# Patient Record
Sex: MALE | Race: WHITE | Employment: OTHER | URBAN - METROPOLITAN AREA
[De-identification: names, ages, dates, MRNs, and addresses within clinical notes are randomized per-mention and may not be internally consistent; named-entity substitution may affect disease eponyms.]

---

## 2017-01-09 RX ORDER — VALSARTAN 160 MG/1
TABLET ORAL
Qty: 90 TABLET | Refills: 0 | Status: SHIPPED | OUTPATIENT
Start: 2017-01-09 | End: 2017-03-10

## 2017-03-03 ENCOUNTER — LAB ENCOUNTER (OUTPATIENT)
Dept: LAB | Age: 72
End: 2017-03-03
Attending: UROLOGY
Payer: MEDICARE

## 2017-03-03 DIAGNOSIS — R97.20 ELEVATED PSA: ICD-10-CM

## 2017-03-03 LAB
BUN BLD-MCNC: 21 MG/DL (ref 8–20)
CREAT BLD-MCNC: 1.04 MG/DL (ref 0.7–1.3)

## 2017-03-03 PROCEDURE — 84520 ASSAY OF UREA NITROGEN: CPT

## 2017-03-03 PROCEDURE — 36415 COLL VENOUS BLD VENIPUNCTURE: CPT

## 2017-03-03 PROCEDURE — 82565 ASSAY OF CREATININE: CPT

## 2017-03-10 ENCOUNTER — OFFICE VISIT (OUTPATIENT)
Dept: FAMILY MEDICINE CLINIC | Facility: CLINIC | Age: 72
End: 2017-03-10

## 2017-03-10 VITALS
HEART RATE: 64 BPM | BODY MASS INDEX: 31 KG/M2 | RESPIRATION RATE: 12 BRPM | TEMPERATURE: 98 F | DIASTOLIC BLOOD PRESSURE: 80 MMHG | WEIGHT: 248 LBS | SYSTOLIC BLOOD PRESSURE: 130 MMHG

## 2017-03-10 DIAGNOSIS — I10 ESSENTIAL HYPERTENSION: Primary | ICD-10-CM

## 2017-03-10 DIAGNOSIS — E78.1 PURE HYPERGLYCERIDEMIA: ICD-10-CM

## 2017-03-10 DIAGNOSIS — N40.1 BENIGN NON-NODULAR PROSTATIC HYPERPLASIA WITH LOWER URINARY TRACT SYMPTOMS: ICD-10-CM

## 2017-03-10 PROCEDURE — 99214 OFFICE O/P EST MOD 30 MIN: CPT | Performed by: FAMILY MEDICINE

## 2017-03-10 RX ORDER — VALSARTAN 160 MG/1
160 TABLET ORAL
Qty: 90 TABLET | Refills: 1 | Status: SHIPPED | OUTPATIENT
Start: 2017-03-10 | End: 2017-10-05

## 2017-03-10 NOTE — PROGRESS NOTES
HPI:   Elida Catalan is a 80year old male who presents for recheck of hyperlipidemia. Patient reports taking 3 fish oil tabs BID no medication side effects noted. Denies any generalized muscle aches. Patient presents for recheck of his hypertension. 1,200 mg by mouth once daily. 6 daily Disp:  Rfl:    Aspirin 81 MG Oral Tab Take 81 mg by mouth daily. Disp:  Rfl:    Multiple Vitamin (MULTIVITAMINS OR) Take  by mouth.  Disp:  Rfl:       Past Medical History   Diagnosis Date   • Hx of oral surgery

## 2017-09-12 ENCOUNTER — CHARTING TRANS (OUTPATIENT)
Dept: OTHER | Age: 72
End: 2017-09-12

## 2017-10-05 DIAGNOSIS — I10 ESSENTIAL HYPERTENSION: ICD-10-CM

## 2017-10-05 RX ORDER — VALSARTAN 160 MG/1
TABLET ORAL
Qty: 90 TABLET | Refills: 0 | Status: SHIPPED | OUTPATIENT
Start: 2017-10-05 | End: 2017-12-29

## 2017-11-13 ENCOUNTER — TELEPHONE (OUTPATIENT)
Dept: FAMILY MEDICINE CLINIC | Facility: CLINIC | Age: 72
End: 2017-11-13

## 2017-11-13 NOTE — TELEPHONE ENCOUNTER
S/w pt. Reports having intermittent RUQ pain past week or so. Says it very sporadic, sharp  at times. Denies any n/v/d or fever. Appt given for tomorrow. Dr Karl Alcaraz schedule full quite  while. Pt agrees to see Nata Alejandre.

## 2017-11-14 ENCOUNTER — OFFICE VISIT (OUTPATIENT)
Dept: FAMILY MEDICINE CLINIC | Facility: CLINIC | Age: 72
End: 2017-11-14

## 2017-11-14 ENCOUNTER — LAB ENCOUNTER (OUTPATIENT)
Dept: LAB | Age: 72
End: 2017-11-14
Attending: FAMILY MEDICINE
Payer: MEDICARE

## 2017-11-14 VITALS
SYSTOLIC BLOOD PRESSURE: 120 MMHG | HEART RATE: 72 BPM | WEIGHT: 247 LBS | TEMPERATURE: 98 F | HEIGHT: 75 IN | DIASTOLIC BLOOD PRESSURE: 74 MMHG | BODY MASS INDEX: 30.71 KG/M2 | RESPIRATION RATE: 18 BRPM | OXYGEN SATURATION: 99 %

## 2017-11-14 DIAGNOSIS — R10.11 RIGHT UPPER QUADRANT PAIN: Primary | ICD-10-CM

## 2017-11-14 DIAGNOSIS — R97.20 ELEVATED PSA: ICD-10-CM

## 2017-11-14 DIAGNOSIS — R10.11 RIGHT UPPER QUADRANT PAIN: ICD-10-CM

## 2017-11-14 DIAGNOSIS — R20.2 PARESTHESIA OF SKIN: ICD-10-CM

## 2017-11-14 PROCEDURE — 85025 COMPLETE CBC W/AUTO DIFF WBC: CPT | Performed by: NURSE PRACTITIONER

## 2017-11-14 PROCEDURE — 99213 OFFICE O/P EST LOW 20 MIN: CPT | Performed by: NURSE PRACTITIONER

## 2017-11-14 PROCEDURE — 80053 COMPREHEN METABOLIC PANEL: CPT | Performed by: NURSE PRACTITIONER

## 2017-11-14 PROCEDURE — 36415 COLL VENOUS BLD VENIPUNCTURE: CPT | Performed by: NURSE PRACTITIONER

## 2017-11-15 NOTE — PROGRESS NOTES
Rafaela Motley is a 71year old male. HPI:   Pt presents today reporting right upper quadrant abdominal pain for the past 2 weeks. He describes the pain as short and stabbing.  He reports that he has noticed it more in the later afternoon-early evening Denies diarrhea. Denies pain worsens with eating. Denies pain improves with eating. Denies any nausea. Denies any vomiting.    NEURO: denies headaches  Musculoskeletal: No motor deficits  EXAM:   /74   Pulse 72   Temp 97.6 °F (36.4 °C) (Oral)   Resp 1

## 2017-11-17 ENCOUNTER — HOSPITAL ENCOUNTER (OUTPATIENT)
Dept: ULTRASOUND IMAGING | Age: 72
Discharge: HOME | End: 2017-11-17
Attending: NURSE PRACTITIONER
Payer: MEDICARE

## 2017-11-17 DIAGNOSIS — K76.9 LESION OF RIGHT LOBE OF LIVER: ICD-10-CM

## 2017-11-17 DIAGNOSIS — K80.20 GALLSTONES: ICD-10-CM

## 2017-11-17 DIAGNOSIS — R10.11 RIGHT UPPER QUADRANT PAIN: ICD-10-CM

## 2017-11-17 DIAGNOSIS — K80.20 GALLSTONES: Primary | ICD-10-CM

## 2017-11-17 DIAGNOSIS — K76.9 LESION OF LEFT LOBE OF LIVER: Primary | ICD-10-CM

## 2017-11-17 PROCEDURE — 76700 US EXAM ABDOM COMPLETE: CPT | Performed by: NURSE PRACTITIONER

## 2017-11-27 ENCOUNTER — HOSPITAL ENCOUNTER (OUTPATIENT)
Dept: MRI IMAGING | Age: 72
Discharge: HOME | End: 2017-11-27
Attending: NURSE PRACTITIONER
Payer: MEDICARE

## 2017-11-27 DIAGNOSIS — K76.9 LESION OF LEFT LOBE OF LIVER: ICD-10-CM

## 2017-11-27 DIAGNOSIS — K76.9 LESION OF RIGHT LOBE OF LIVER: ICD-10-CM

## 2017-11-27 PROCEDURE — A9581 GADOXETATE DISODIUM INJ: HCPCS | Performed by: NURSE PRACTITIONER

## 2017-11-27 PROCEDURE — 74183 MRI ABD W/O CNTR FLWD CNTR: CPT | Performed by: NURSE PRACTITIONER

## 2017-11-29 ENCOUNTER — TELEPHONE (OUTPATIENT)
Dept: FAMILY MEDICINE CLINIC | Facility: CLINIC | Age: 72
End: 2017-11-29

## 2017-12-08 ENCOUNTER — OFFICE VISIT (OUTPATIENT)
Dept: SURGERY | Facility: CLINIC | Age: 72
End: 2017-12-08

## 2017-12-08 VITALS
BODY MASS INDEX: 31 KG/M2 | WEIGHT: 247 LBS | DIASTOLIC BLOOD PRESSURE: 70 MMHG | HEART RATE: 76 BPM | SYSTOLIC BLOOD PRESSURE: 125 MMHG | RESPIRATION RATE: 16 BRPM

## 2017-12-08 DIAGNOSIS — K80.10 CALCULUS OF GALLBLADDER WITH CHRONIC CHOLECYSTITIS WITHOUT OBSTRUCTION: Primary | ICD-10-CM

## 2017-12-08 PROCEDURE — 99203 OFFICE O/P NEW LOW 30 MIN: CPT | Performed by: SURGERY

## 2017-12-08 NOTE — H&P
New Patient Visit Note       Active Problems      1.  Calculus of gallbladder with chronic cholecystitis without obstruction        Chief Complaint   Patient presents with:  Establish Care: Gallstones      History of Present Illness     Patient presents at Smoking status: Never Smoker                                                                Smokeless tobacco: Never Used                        Alcohol use:  Yes                Comment: WINE 1 GLASS AT NIGHT    Drug use: No            Other Topics and time. He appears well-developed and well-nourished. No distress. HENT:   Head: Normocephalic and atraumatic. Eyes: Pupils are equal, round, and reactive to light. No scleral icterus. Neck: Normal range of motion. Neck supple. No JVD present.  No t and lifting recommendations were discussed in length. ·   · The risks, benefits, and alternatives to the procedure were explained to the patient.   The risks explained include, but are not limited to, bleeding, infection, pain wound complications, recurre

## 2017-12-20 ENCOUNTER — OFFICE VISIT (OUTPATIENT)
Dept: SURGERY | Facility: CLINIC | Age: 72
End: 2017-12-20

## 2017-12-20 VITALS
BODY MASS INDEX: 30.77 KG/M2 | SYSTOLIC BLOOD PRESSURE: 132 MMHG | HEART RATE: 71 BPM | HEIGHT: 75 IN | OXYGEN SATURATION: 98 % | DIASTOLIC BLOOD PRESSURE: 73 MMHG | TEMPERATURE: 98 F | WEIGHT: 247.5 LBS

## 2017-12-20 DIAGNOSIS — K76.0 NAFLD (NONALCOHOLIC FATTY LIVER DISEASE): ICD-10-CM

## 2017-12-20 DIAGNOSIS — K76.9 LIVER LESION: Primary | ICD-10-CM

## 2017-12-20 NOTE — PROGRESS NOTES
[facility] at [facility]  [address] [facility] [address], [address]  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  His liver tests are normal and his imaging is not bergeron

## 2017-12-29 ENCOUNTER — OFFICE VISIT (OUTPATIENT)
Dept: FAMILY MEDICINE CLINIC | Facility: CLINIC | Age: 72
End: 2017-12-29

## 2017-12-29 VITALS
TEMPERATURE: 98 F | WEIGHT: 247 LBS | RESPIRATION RATE: 16 BRPM | DIASTOLIC BLOOD PRESSURE: 84 MMHG | HEART RATE: 76 BPM | BODY MASS INDEX: 30.71 KG/M2 | HEIGHT: 75 IN | SYSTOLIC BLOOD PRESSURE: 120 MMHG

## 2017-12-29 DIAGNOSIS — I10 ESSENTIAL HYPERTENSION: ICD-10-CM

## 2017-12-29 DIAGNOSIS — Z00.00 ENCOUNTER FOR ANNUAL HEALTH EXAMINATION: Primary | ICD-10-CM

## 2017-12-29 DIAGNOSIS — E78.1 PURE HYPERGLYCERIDEMIA: ICD-10-CM

## 2017-12-29 PROCEDURE — 96160 PT-FOCUSED HLTH RISK ASSMT: CPT | Performed by: FAMILY MEDICINE

## 2017-12-29 PROCEDURE — G0439 PPPS, SUBSEQ VISIT: HCPCS | Performed by: FAMILY MEDICINE

## 2017-12-29 RX ORDER — VALSARTAN 160 MG/1
TABLET ORAL
Qty: 90 TABLET | Refills: 1 | Status: SHIPPED | OUTPATIENT
Start: 2017-12-29 | End: 2018-06-21

## 2017-12-29 NOTE — PROGRESS NOTES
HPI:   Donaldo Schaffer is a 71year old male who presents for a MA (Medicare Advantage) [address] [facility] (Once per calendar year). Patient states he has no new complaints. He is seen by urology for his elevated PSA.   Patient is here for follow-up of h MD as PCP - General (Family Practice)    Patient Active Problem List:     Other abnormal blood chemistry     Other symptoms involving cardiovascular system     Microscopic hematuria     Essential hypertension     Hyperlipidemia     Right sided sciatica SYSTEMS:   GENERAL: feels well otherwise  SKIN: denies any unusual skin lesions  EYES: denies blurred vision or double vision  HEENT: denies nasal congestion, sinus pain or ST  LUNGS: denies shortness of breath with exertion  CARDIOVASCULAR: denies chest p No tenderness or deformity   Heart:  Regular rate and rhythm, S1, S2 normal, no murmur, rub or gallop   Abdomen:   Soft, non-tender, bowel sounds active all four quadrants,  no masses, no organomegaly   Genitalia:  Deferred, checked by urology recently   R patient  PREVENTATIVE SERVICES  INDICATIONS AND SCHEDULE Internal Lab or Procedure External Lab or Procedure   Diabetes Screening      HbgA1C   Annually HgbA1C (%)   Date Value   10/28/2015 5.6       No flowsheet data found.     Fasting Blood Sugar (FSB)Jaida HepB virus carrier   Homosexual men   Illicit injectable drug abusers     Tetanus Toxoid  Only covered with a cut with metal- TD and TDaP Not covered by Medicare Part B) No vaccine history found This may be covered with your prescription benefits, but Medi

## 2017-12-29 NOTE — PATIENT INSTRUCTIONS
Redd Daniels's SCREENING SCHEDULE   Tests on this list are recommended by your physician but may not be covered, or covered at this frequency, by your insurer. Please check with your insurance carrier before scheduling to verify coverage.     PREVEN Medicare Visit    Abdominal aortic aneurysm screening (once between ages 73-68)  No results found for this or any previous visit.  Limited to patients who meet one of the following criteria:   • Men who are 73-68 years old and have smoked more than 100 ciga or IX concentrates   Clients of institutions for the mentally retarded   Persons who live in the same house as a HepB virus carrier   Homosexual men   Illicit injectable drug abusers     Tetanus Toxoid- Only covered with a cut with metal- TD and TDaP Not c

## 2018-01-10 ENCOUNTER — APPOINTMENT (OUTPATIENT)
Dept: LAB | Age: 73
End: 2018-01-10
Attending: FAMILY MEDICINE
Payer: MEDICARE

## 2018-05-11 ENCOUNTER — HOSPITAL ENCOUNTER (OUTPATIENT)
Dept: MRI IMAGING | Age: 73
Discharge: HOME | End: 2018-05-11
Attending: INTERNAL MEDICINE
Payer: MEDICARE

## 2018-05-11 DIAGNOSIS — K76.9 LIVER LESION: ICD-10-CM

## 2018-05-11 PROCEDURE — A9581 GADOXETATE DISODIUM INJ: HCPCS | Performed by: INTERNAL MEDICINE

## 2018-05-11 PROCEDURE — 74183 MRI ABD W/O CNTR FLWD CNTR: CPT | Performed by: INTERNAL MEDICINE

## 2018-05-21 ENCOUNTER — OFFICE VISIT (OUTPATIENT)
Dept: SURGERY | Facility: CLINIC | Age: 73
End: 2018-05-21

## 2018-05-21 VITALS
DIASTOLIC BLOOD PRESSURE: 70 MMHG | OXYGEN SATURATION: 96 % | SYSTOLIC BLOOD PRESSURE: 111 MMHG | BODY MASS INDEX: 30.4 KG/M2 | RESPIRATION RATE: 20 BRPM | HEIGHT: 75 IN | HEART RATE: 69 BPM | WEIGHT: 244.5 LBS

## 2018-05-21 DIAGNOSIS — K76.9 LIVER LESION: Primary | ICD-10-CM

## 2018-05-21 RX ORDER — AMOXICILLIN 500 MG/1
TABLET, FILM COATED ORAL
Refills: 0 | COMMUNITY
Start: 2018-04-18 | End: 2018-05-21 | Stop reason: ALTCHOICE

## 2018-05-21 RX ORDER — HYDROCODONE BITARTRATE AND ACETAMINOPHEN 5; 325 MG/1; MG/1
TABLET ORAL
Refills: 0 | COMMUNITY
Start: 2018-04-18 | End: 2018-05-21 | Stop reason: ALTCHOICE

## 2018-05-21 NOTE — PROGRESS NOTES
[facility]  [address] [facility], [address]  [address]  [phone] ([phone]) no asterixis  Psych: normal affect/mood     Assessment and Plan:  68year old with NAFLD and three liver lesions (1 cyst, 2?hemangiomas)    - U/S in 6 months; if lesions remain stable in size then likely re-image in 1 year.  Discussed that almost certainly

## 2018-06-20 ENCOUNTER — LAB ENCOUNTER (OUTPATIENT)
Dept: LAB | Age: 73
End: 2018-06-20
Attending: FAMILY MEDICINE
Payer: MEDICARE

## 2018-06-20 DIAGNOSIS — K76.9 LIVER LESION: ICD-10-CM

## 2018-06-20 DIAGNOSIS — E78.1 PURE HYPERGLYCERIDEMIA: ICD-10-CM

## 2018-06-20 DIAGNOSIS — R73.9 HYPERGLYCEMIA: ICD-10-CM

## 2018-06-20 DIAGNOSIS — R73.9 HYPERGLYCEMIA: Primary | ICD-10-CM

## 2018-06-20 DIAGNOSIS — K76.9 LIVER LESION: Primary | ICD-10-CM

## 2018-06-20 DIAGNOSIS — I10 ESSENTIAL HYPERTENSION: ICD-10-CM

## 2018-06-20 PROCEDURE — 85025 COMPLETE CBC W/AUTO DIFF WBC: CPT

## 2018-06-20 PROCEDURE — 80053 COMPREHEN METABOLIC PANEL: CPT

## 2018-06-20 PROCEDURE — 36415 COLL VENOUS BLD VENIPUNCTURE: CPT

## 2018-06-20 PROCEDURE — 83036 HEMOGLOBIN GLYCOSYLATED A1C: CPT

## 2018-06-20 PROCEDURE — 82105 ALPHA-FETOPROTEIN SERUM: CPT

## 2018-06-20 PROCEDURE — 80061 LIPID PANEL: CPT

## 2018-06-21 DIAGNOSIS — I10 ESSENTIAL HYPERTENSION: ICD-10-CM

## 2018-06-21 RX ORDER — VALSARTAN 160 MG/1
TABLET ORAL
Qty: 30 TABLET | Refills: 0 | Status: SHIPPED | OUTPATIENT
Start: 2018-06-21 | End: 2018-07-24

## 2018-06-21 NOTE — TELEPHONE ENCOUNTER
Pt scheduled a 6 month BP med check with Dr. Kenyetta Quick on 07/23. Pt has 10 more pills for his BP medication.

## 2018-06-29 PROCEDURE — 88305 TISSUE EXAM BY PATHOLOGIST: CPT | Performed by: INTERNAL MEDICINE

## 2018-07-23 ENCOUNTER — APPOINTMENT (OUTPATIENT)
Dept: LAB | Age: 73
End: 2018-07-23
Attending: FAMILY MEDICINE
Payer: MEDICARE

## 2018-07-23 ENCOUNTER — OFFICE VISIT (OUTPATIENT)
Dept: FAMILY MEDICINE CLINIC | Facility: CLINIC | Age: 73
End: 2018-07-23
Payer: MEDICARE

## 2018-07-23 VITALS
HEART RATE: 74 BPM | HEIGHT: 74.5 IN | DIASTOLIC BLOOD PRESSURE: 80 MMHG | BODY MASS INDEX: 30.28 KG/M2 | WEIGHT: 238.5 LBS | SYSTOLIC BLOOD PRESSURE: 128 MMHG | OXYGEN SATURATION: 99 % | RESPIRATION RATE: 16 BRPM

## 2018-07-23 DIAGNOSIS — I10 ESSENTIAL HYPERTENSION: Primary | ICD-10-CM

## 2018-07-23 DIAGNOSIS — R97.20 ELEVATED PSA, BETWEEN 10 AND LESS THAN 20 NG/ML: ICD-10-CM

## 2018-07-23 DIAGNOSIS — E78.1 PURE HYPERGLYCERIDEMIA: ICD-10-CM

## 2018-07-23 LAB
PSA FREE MFR SERPL: 27 %
PSA FREE SERPL-MCNC: 3.64 NG/ML
PSA SERPL-MCNC: 13.6 NG/ML (ref 0.01–4)

## 2018-07-23 PROCEDURE — 84153 ASSAY OF PSA TOTAL: CPT

## 2018-07-23 PROCEDURE — 99214 OFFICE O/P EST MOD 30 MIN: CPT | Performed by: FAMILY MEDICINE

## 2018-07-23 PROCEDURE — 36415 COLL VENOUS BLD VENIPUNCTURE: CPT

## 2018-07-23 PROCEDURE — 84154 ASSAY OF PSA FREE: CPT

## 2018-07-23 RX ORDER — VALSARTAN 160 MG/1
TABLET ORAL
Qty: 90 TABLET | Refills: 1 | Status: CANCELLED | OUTPATIENT
Start: 2018-07-23

## 2018-07-23 NOTE — PROGRESS NOTES
HPI:   Elida Catalan is a 68year old male who presents for recheck of hyperlipidemia. Patient reports taking 3 fish oil tabs BID no medication side effects noted. Denies any generalized muscle aches. Patient presents for recheck of his hypertension. 11/14/2017 44   10/20/2016 34   ----------       Current Outpatient Prescriptions:  Omega-3 Fatty Acids (FISH OIL OR) Take 1,200 mg by mouth once daily. 6 daily Disp:  Rfl:    Aspirin 81 MG Oral Tab Take 81 mg by mouth daily.  Disp:  Rfl:    Multiple Suzette 20 ng/ml  Patient to follow-up with urology  - PSA, TOTAL AND FREE; Future    3. Pure hyperglyceridemia  Patient to follow low-fat diet and continue with fish oil supplementation  - COMP METABOLIC PANEL (14); Future  - LIPID PANEL;  Future

## 2018-07-24 ENCOUNTER — TELEPHONE (OUTPATIENT)
Dept: FAMILY MEDICINE CLINIC | Facility: CLINIC | Age: 73
End: 2018-07-24

## 2018-07-24 RX ORDER — LOSARTAN POTASSIUM 100 MG/1
100 TABLET ORAL DAILY
Qty: 90 TABLET | Refills: 0 | Status: SHIPPED | OUTPATIENT
Start: 2018-07-24 | End: 2018-10-17

## 2018-07-24 NOTE — TELEPHONE ENCOUNTER
Pt advised     Rx for Losartan 100 mg daily sent to replace Valsartan 160 mg daily to pharm     Advised to monitor BP and update in 10-14 days     Fyi

## 2018-07-24 NOTE — TELEPHONE ENCOUNTER
Pt called and advised that he needs a similar medicine to valsartan 160 MG Oral Tab. He was advised that his lot was recalled.   Please advise at [phone]

## 2018-09-27 ENCOUNTER — CHARTING TRANS (OUTPATIENT)
Dept: OTHER | Age: 73
End: 2018-09-27

## 2018-10-17 RX ORDER — LOSARTAN POTASSIUM 100 MG/1
TABLET ORAL
Qty: 90 TABLET | Refills: 0 | Status: SHIPPED | OUTPATIENT
Start: 2018-10-17 | End: 2019-01-14

## 2018-11-02 VITALS — TEMPERATURE: 98.2 F

## 2018-11-19 ENCOUNTER — HOSPITAL ENCOUNTER (OUTPATIENT)
Dept: ULTRASOUND IMAGING | Age: 73
Discharge: HOME | End: 2018-11-19
Attending: INTERNAL MEDICINE
Payer: MEDICARE

## 2018-11-19 DIAGNOSIS — K76.9 LIVER LESION: ICD-10-CM

## 2018-11-19 PROCEDURE — 76700 US EXAM ABDOM COMPLETE: CPT | Performed by: INTERNAL MEDICINE

## 2018-12-07 DIAGNOSIS — K76.9 LIVER LESION: Primary | ICD-10-CM

## 2019-01-14 ENCOUNTER — OFFICE VISIT (OUTPATIENT)
Dept: FAMILY MEDICINE CLINIC | Facility: CLINIC | Age: 74
End: 2019-01-14
Payer: MEDICARE

## 2019-01-14 ENCOUNTER — TELEPHONE (OUTPATIENT)
Dept: FAMILY MEDICINE CLINIC | Facility: CLINIC | Age: 74
End: 2019-01-14

## 2019-01-14 VITALS
BODY MASS INDEX: 29.59 KG/M2 | RESPIRATION RATE: 16 BRPM | HEART RATE: 64 BPM | SYSTOLIC BLOOD PRESSURE: 118 MMHG | WEIGHT: 233 LBS | TEMPERATURE: 97 F | HEIGHT: 74.5 IN | DIASTOLIC BLOOD PRESSURE: 68 MMHG

## 2019-01-14 DIAGNOSIS — R39.11 BENIGN PROSTATIC HYPERPLASIA WITH URINARY HESITANCY: ICD-10-CM

## 2019-01-14 DIAGNOSIS — R31.29 MICROSCOPIC HEMATURIA: ICD-10-CM

## 2019-01-14 DIAGNOSIS — R97.20 ELEVATED PSA, BETWEEN 10 AND LESS THAN 20 NG/ML: ICD-10-CM

## 2019-01-14 DIAGNOSIS — I10 ESSENTIAL HYPERTENSION: Primary | ICD-10-CM

## 2019-01-14 DIAGNOSIS — N40.1 BENIGN PROSTATIC HYPERPLASIA WITH URINARY HESITANCY: ICD-10-CM

## 2019-01-14 PROCEDURE — 99214 OFFICE O/P EST MOD 30 MIN: CPT | Performed by: FAMILY MEDICINE

## 2019-01-14 RX ORDER — LOSARTAN POTASSIUM 100 MG/1
TABLET ORAL
Qty: 90 TABLET | Refills: 1 | Status: SHIPPED | OUTPATIENT
Start: 2019-01-14 | End: 2019-07-10

## 2019-01-14 NOTE — PROGRESS NOTES
HPI:   Yolanda Moura is a 68year old male who presents for recheck of hyperlipidemia. Patient reports taking 3 fish oil tabs BID no medication side effects noted. Denies any generalized muscle aches. Patient presents for recheck of his hypertension. TABLET(100 MG) BY MOUTH DAILY Disp: 90 tablet Rfl: 1   Omega-3 Fatty Acids (FISH OIL OR) Take 1,200 mg by mouth once daily. 6 daily Disp:  Rfl:    Aspirin 81 MG Oral Tab Take 81 mg by mouth daily.  Disp:  Rfl:    Multiple Vitamin (MULTIVITAMINS OR) Take  by ng/ml  Continue follow-up with urology    4. Microscopic hematuria  Continue follow-up with urology    Will have CMP and lipid originally ordered on 7/27/2018 done in the next several days.

## 2019-01-18 ENCOUNTER — LAB ENCOUNTER (OUTPATIENT)
Dept: LAB | Age: 74
End: 2019-01-18
Attending: FAMILY MEDICINE
Payer: MEDICARE

## 2019-01-18 DIAGNOSIS — R97.20 ELEVATED PSA, BETWEEN 10 AND LESS THAN 20 NG/ML: ICD-10-CM

## 2019-01-18 DIAGNOSIS — R97.20 ELEVATED PSA, BETWEEN 10 AND LESS THAN 20 NG/ML: Primary | ICD-10-CM

## 2019-01-18 DIAGNOSIS — E78.1 PURE HYPERGLYCERIDEMIA: ICD-10-CM

## 2019-01-18 DIAGNOSIS — I10 ESSENTIAL HYPERTENSION: ICD-10-CM

## 2019-01-18 LAB
ALBUMIN SERPL-MCNC: 3.7 G/DL (ref 3.1–4.5)
ALBUMIN/GLOB SERPL: 1 {RATIO} (ref 1–2)
ALP LIVER SERPL-CCNC: 58 U/L (ref 45–117)
ALT SERPL-CCNC: 28 U/L (ref 17–63)
ANION GAP SERPL CALC-SCNC: 7 MMOL/L (ref 0–18)
AST SERPL-CCNC: 17 U/L (ref 15–41)
BILIRUB SERPL-MCNC: 0.8 MG/DL (ref 0.1–2)
BUN BLD-MCNC: 18 MG/DL (ref 8–20)
BUN/CREAT SERPL: 16.5 (ref 10–20)
CALCIUM BLD-MCNC: 9.3 MG/DL (ref 8.3–10.3)
CHLORIDE SERPL-SCNC: 107 MMOL/L (ref 101–111)
CHOLEST SMN-MCNC: 162 MG/DL (ref ?–200)
CO2 SERPL-SCNC: 28 MMOL/L (ref 22–32)
CREAT BLD-MCNC: 1.09 MG/DL (ref 0.7–1.3)
GLOBULIN PLAS-MCNC: 3.7 G/DL (ref 2.8–4.4)
GLUCOSE BLD-MCNC: 90 MG/DL (ref 70–99)
HDLC SERPL-MCNC: 39 MG/DL (ref 40–59)
LDLC SERPL CALC-MCNC: 92 MG/DL (ref ?–100)
M PROTEIN MFR SERPL ELPH: 7.4 G/DL (ref 6.4–8.2)
NONHDLC SERPL-MCNC: 123 MG/DL (ref ?–130)
OSMOLALITY SERPL CALC.SUM OF ELEC: 295 MOSM/KG (ref 275–295)
POTASSIUM SERPL-SCNC: 4.3 MMOL/L (ref 3.6–5.1)
PSA FREE MFR SERPL: 26 %
PSA FREE SERPL-MCNC: 4.33 NG/ML
PSA SERPL-MCNC: 16.8 NG/ML (ref 0.01–4)
SODIUM SERPL-SCNC: 142 MMOL/L (ref 136–144)
TRIGL SERPL-MCNC: 157 MG/DL (ref 30–149)
VLDLC SERPL CALC-MCNC: 31 MG/DL (ref 0–30)

## 2019-01-18 PROCEDURE — 84153 ASSAY OF PSA TOTAL: CPT

## 2019-01-18 PROCEDURE — 80061 LIPID PANEL: CPT

## 2019-01-18 PROCEDURE — 84154 ASSAY OF PSA FREE: CPT

## 2019-01-18 PROCEDURE — 80053 COMPREHEN METABOLIC PANEL: CPT

## 2019-01-18 PROCEDURE — 36415 COLL VENOUS BLD VENIPUNCTURE: CPT

## 2019-02-19 ENCOUNTER — WALK IN (OUTPATIENT)
Dept: URGENT CARE | Age: 74
End: 2019-02-19

## 2019-02-19 DIAGNOSIS — Z23 NEED FOR PNEUMOCOCCAL VACCINE: Primary | ICD-10-CM

## 2019-02-19 PROCEDURE — 90670 PCV13 VACCINE IM: CPT | Performed by: NURSE PRACTITIONER

## 2019-02-19 PROCEDURE — G0009 ADMIN PNEUMOCOCCAL VACCINE: HCPCS | Performed by: NURSE PRACTITIONER

## 2019-03-26 ENCOUNTER — HOSPITAL ENCOUNTER (OUTPATIENT)
Dept: CT IMAGING | Age: 74
Discharge: HOME | End: 2019-03-26
Attending: FAMILY MEDICINE

## 2019-03-26 ENCOUNTER — TELEPHONE (OUTPATIENT)
Dept: FAMILY MEDICINE CLINIC | Facility: CLINIC | Age: 74
End: 2019-03-26

## 2019-03-26 DIAGNOSIS — Z13.9 VISIT FOR SCREENING: ICD-10-CM

## 2019-03-26 NOTE — TELEPHONE ENCOUNTER
Medical Records Request received from [facility] for all records from 3/4/14 to present. Records to be sent to:     [facility]  [address] [facility], [address] [place] [address]    Release sent to [employer] on 3/26/19.   Release also sent to be sca

## 2019-04-05 ENCOUNTER — OFFICE VISIT (OUTPATIENT)
Dept: FAMILY MEDICINE CLINIC | Facility: CLINIC | Age: 74
End: 2019-04-05
Payer: MEDICARE

## 2019-04-05 VITALS
TEMPERATURE: 98 F | RESPIRATION RATE: 12 BRPM | WEIGHT: 231 LBS | HEART RATE: 68 BPM | DIASTOLIC BLOOD PRESSURE: 70 MMHG | BODY MASS INDEX: 29.33 KG/M2 | SYSTOLIC BLOOD PRESSURE: 124 MMHG | HEIGHT: 74.5 IN

## 2019-04-05 DIAGNOSIS — E78.2 MIXED HYPERLIPIDEMIA: ICD-10-CM

## 2019-04-05 DIAGNOSIS — R93.1 ABNORMAL SCREENING CARDIAC CT: Primary | ICD-10-CM

## 2019-04-05 PROCEDURE — 99213 OFFICE O/P EST LOW 20 MIN: CPT | Performed by: FAMILY MEDICINE

## 2019-04-09 ENCOUNTER — PATIENT MESSAGE (OUTPATIENT)
Dept: FAMILY MEDICINE CLINIC | Facility: CLINIC | Age: 74
End: 2019-04-09

## 2019-04-10 RX ORDER — ATORVASTATIN CALCIUM 20 MG/1
20 TABLET, FILM COATED ORAL NIGHTLY
Qty: 90 TABLET | Refills: 1 | Status: SHIPPED | OUTPATIENT
Start: 2019-04-10 | End: 2019-10-07

## 2019-04-10 NOTE — TELEPHONE ENCOUNTER
Please inform patient that his stress echo has been ordered. He will need to repeat his CMP and lipids in 3 months. A prescription for atorvastatin has been sent to [facility].   The MRI of the abdomen inquired about had been previously ordered by Dr. Shahnaz Hooks

## 2019-04-11 NOTE — TELEPHONE ENCOUNTER
Outgoing call to patient information and recommendations discussed with patient, voiced understanding, agreed with plan.

## 2019-04-15 ENCOUNTER — TELEPHONE (OUTPATIENT)
Dept: FAMILY MEDICINE CLINIC | Facility: CLINIC | Age: 74
End: 2019-04-15

## 2019-04-15 ENCOUNTER — TELEPHONE (OUTPATIENT)
Dept: CARDIAC REHAB | Facility: HOSPITAL | Age: 74
End: 2019-04-15

## 2019-04-15 NOTE — TELEPHONE ENCOUNTER
Delphine from [address] Calcium Scoring over read called. Pt had an appt on 04/05/19. She did not see that the dilated thoracic aorta or the aortic valve  calcification was addressed with the patient at his visit. Please review and advise.  Report placed at yo

## 2019-04-15 NOTE — PROGRESS NOTES
Patient had heart scan completed on 3/26/16.  In addition to the calcium scoring, patient has a cardiac incidental finding of Dilated Thoracic Aorta and aortic valve calcification Pt had Follow up appointment,  office called and spoke to Dr Tico Franks nurse

## 2019-04-15 NOTE — TELEPHONE ENCOUNTER
I spoke with Dr. Swati Gracia and he did order a stress echo for pt. He will await these results and if this does not capture what he needs to evaluate the aortic valve calcification and the Dilated thoracic aorta he will order additional testing.

## 2019-04-16 ENCOUNTER — TELEPHONE (OUTPATIENT)
Dept: OTHER | Facility: HOSPITAL | Age: 74
End: 2019-04-16

## 2019-04-29 ENCOUNTER — HOSPITAL ENCOUNTER (OUTPATIENT)
Dept: MRI IMAGING | Age: 74
Discharge: HOME | End: 2019-04-29
Attending: INTERNAL MEDICINE
Payer: MEDICARE

## 2019-04-29 DIAGNOSIS — K76.9 LIVER LESION: ICD-10-CM

## 2019-04-29 PROCEDURE — A9581 GADOXETATE DISODIUM INJ: HCPCS | Performed by: INTERNAL MEDICINE

## 2019-04-29 PROCEDURE — 74183 MRI ABD W/O CNTR FLWD CNTR: CPT | Performed by: INTERNAL MEDICINE

## 2019-05-11 DIAGNOSIS — K76.9 LIVER LESION: Primary | ICD-10-CM

## 2019-05-14 NOTE — TELEPHONE ENCOUNTER
Tc to [place], she is at lunch. I s/w Preeti echo tech. Advised her that Dr Mihir Chauhan still wants the stress echo. She states \"well he's not gonna see what he is looking for\"  I stated again this was his recommendation. She voiced understanding.

## 2019-05-14 NOTE — TELEPHONE ENCOUNTER
Call from [facility] pt is sched for stress echo tomorrow. sts notes below reviewed and that stress echo will not evaluate the aorta calcifications or valves but 2D echo will.    \"want to let dr Sameer Jimenez know and see i

## 2019-05-15 ENCOUNTER — HOSPITAL ENCOUNTER (OUTPATIENT)
Dept: CV DIAGNOSTICS | Age: 74
Discharge: HOME | End: 2019-05-15
Attending: FAMILY MEDICINE
Payer: MEDICARE

## 2019-05-15 DIAGNOSIS — E78.2 MIXED HYPERLIPIDEMIA: ICD-10-CM

## 2019-05-15 DIAGNOSIS — R93.1 ABNORMAL SCREENING CARDIAC CT: ICD-10-CM

## 2019-05-15 PROCEDURE — 93018 CV STRESS TEST I&R ONLY: CPT | Performed by: FAMILY MEDICINE

## 2019-05-15 PROCEDURE — 93017 CV STRESS TEST TRACING ONLY: CPT | Performed by: FAMILY MEDICINE

## 2019-05-15 PROCEDURE — 93350 STRESS TTE ONLY: CPT | Performed by: FAMILY MEDICINE

## 2019-07-10 DIAGNOSIS — I10 ESSENTIAL HYPERTENSION: ICD-10-CM

## 2019-07-11 RX ORDER — LOSARTAN POTASSIUM 100 MG/1
TABLET ORAL
Qty: 90 TABLET | Refills: 0 | Status: SHIPPED | OUTPATIENT
Start: 2019-07-11 | End: 2019-10-07

## 2019-07-29 ENCOUNTER — OFFICE VISIT (OUTPATIENT)
Dept: FAMILY MEDICINE CLINIC | Facility: CLINIC | Age: 74
End: 2019-07-29
Payer: MEDICARE

## 2019-07-29 VITALS
HEART RATE: 72 BPM | SYSTOLIC BLOOD PRESSURE: 118 MMHG | WEIGHT: 229 LBS | BODY MASS INDEX: 29.08 KG/M2 | HEIGHT: 74.5 IN | RESPIRATION RATE: 16 BRPM | DIASTOLIC BLOOD PRESSURE: 62 MMHG | TEMPERATURE: 97 F

## 2019-07-29 DIAGNOSIS — E78.2 MIXED HYPERLIPIDEMIA: ICD-10-CM

## 2019-07-29 DIAGNOSIS — Z11.59 NEED FOR HEPATITIS C SCREENING TEST: ICD-10-CM

## 2019-07-29 DIAGNOSIS — N40.1 BENIGN PROSTATIC HYPERPLASIA WITH URINARY HESITANCY: ICD-10-CM

## 2019-07-29 DIAGNOSIS — I10 ESSENTIAL HYPERTENSION: ICD-10-CM

## 2019-07-29 DIAGNOSIS — Z00.00 ENCOUNTER FOR ANNUAL HEALTH EXAMINATION: Primary | ICD-10-CM

## 2019-07-29 DIAGNOSIS — R39.11 BENIGN PROSTATIC HYPERPLASIA WITH URINARY HESITANCY: ICD-10-CM

## 2019-07-29 DIAGNOSIS — R97.20 ELEVATED PSA, BETWEEN 10 AND LESS THAN 20 NG/ML: ICD-10-CM

## 2019-07-29 PROCEDURE — 99397 PER PM REEVAL EST PAT 65+ YR: CPT | Performed by: FAMILY MEDICINE

## 2019-07-29 PROCEDURE — G0439 PPPS, SUBSEQ VISIT: HCPCS | Performed by: FAMILY MEDICINE

## 2019-07-29 PROCEDURE — 96160 PT-FOCUSED HLTH RISK ASSMT: CPT | Performed by: FAMILY MEDICINE

## 2019-07-29 RX ORDER — ATORVASTATIN CALCIUM 20 MG/1
1 TABLET, FILM COATED ORAL DAILY
Refills: 1 | COMMUNITY
Start: 2019-07-10 | End: 2019-10-07 | Stop reason: SDUPTHER

## 2019-07-29 NOTE — PROGRESS NOTES
HPI:   Maribell Meier is a 69year old male who presents for a MA (Medicare Advantage) [address] [facility] (Once per calendar year). Patient states he has no new complaints. He is seen by urology for his elevated PSA.   Patient is here for follow-up of h - General (Family Practice)    Patient Active Problem List:     Other abnormal blood chemistry     Other symptoms involving cardiovascular system     Microscopic hematuria     Essential hypertension     Hyperlipidemia     Right sided sciatica     BPH (becky that he does not use drugs.      REVIEW OF SYSTEMS:   GENERAL: feels well otherwise  SKIN: denies any unusual skin lesions  EYES: denies blurred vision or double vision  HEENT: denies nasal congestion, sinus pain or ST  LUNGS: denies shortness of breath wit drainage or sinus tenderness   Throat: Lips, mucosa, and tongue normal; teeth and gums normal   Neck: Supple, symmetrical, trachea midline, no adenopathy, thyroid: not enlarged, symmetric, no tenderness/mass/nodules, no carotid bruit or JVD   Back:   Symme screening test  -     HCV ANTIBODY; Future    Patient will also have CMP and lipid ordered on 4/10/2019 done also    Diet assessment: good     PLAN:  The patient indicates understanding of these issues and agrees to the plan.   Reinforced healthy diet, life Glaucoma Screening      Ophthalmology Visit Annually: Diabetics, FHx Glaucoma, AA>50, > 65 No flowsheet data found.     Prostate Cancer Screening      PSA  Annually PSA due on 01/18/2020  Update Health Maintenance if applicable     Immunizations (

## 2019-07-29 NOTE — PATIENT INSTRUCTIONS
Redd Daniels's SCREENING SCHEDULE   Tests on this list are recommended by your physician but may not be covered, or covered at this frequency, by your insurer. Please check with your insurance carrier before scheduling to verify coverage.     PREVEN aortic aneurysm screening (once between ages 73-68)  No results found for this or any previous visit.  Limited to patients who meet one of the following criteria:   • Men who are 73-68 years old and have smoked more than 100 cigarettes in their lifetime   • institutions for the mentally retarded   Persons who live in the same house as a HepB virus carrier   Homosexual men   Illicit injectable drug abusers     Tetanus Toxoid- Only covered with a cut with metal- TD and TDaP Not covered by Medicare Part B) No or

## 2019-08-15 ENCOUNTER — APPOINTMENT (OUTPATIENT)
Dept: LAB | Age: 74
End: 2019-08-15
Attending: FAMILY MEDICINE
Payer: MEDICARE

## 2019-08-15 DIAGNOSIS — Z11.59 NEED FOR HEPATITIS C SCREENING TEST: ICD-10-CM

## 2019-08-15 DIAGNOSIS — I10 HYPERTENSION, UNSPECIFIED TYPE: ICD-10-CM

## 2019-08-15 DIAGNOSIS — E78.5 HYPERLIPIDEMIA, UNSPECIFIED HYPERLIPIDEMIA TYPE: ICD-10-CM

## 2019-08-15 DIAGNOSIS — R93.1 ABNORMAL SCREENING CARDIAC CT: ICD-10-CM

## 2019-08-15 DIAGNOSIS — E78.2 MIXED HYPERLIPIDEMIA: ICD-10-CM

## 2019-08-15 DIAGNOSIS — N40.0 BENIGN PROSTATIC HYPERPLASIA, UNSPECIFIED WHETHER LOWER URINARY TRACT SYMPTOMS PRESENT: Primary | ICD-10-CM

## 2019-08-15 LAB
ALBUMIN SERPL-MCNC: 4 G/DL (ref 3.4–5)
ALBUMIN/GLOB SERPL: 1.2 {RATIO} (ref 1–2)
ALP LIVER SERPL-CCNC: 57 U/L (ref 45–117)
ALT SERPL-CCNC: 23 U/L (ref 16–61)
ANION GAP SERPL CALC-SCNC: 7 MMOL/L (ref 0–18)
AST SERPL-CCNC: 16 U/L (ref 15–37)
BILIRUB SERPL-MCNC: 0.7 MG/DL (ref 0.1–2)
BUN BLD-MCNC: 23 MG/DL (ref 7–18)
BUN/CREAT SERPL: 21.3 (ref 10–20)
CALCIUM BLD-MCNC: 8.6 MG/DL (ref 8.5–10.1)
CHLORIDE SERPL-SCNC: 110 MMOL/L (ref 98–112)
CHOLEST SMN-MCNC: 103 MG/DL (ref ?–200)
CO2 SERPL-SCNC: 26 MMOL/L (ref 21–32)
CREAT BLD-MCNC: 1.08 MG/DL (ref 0.7–1.3)
GLOBULIN PLAS-MCNC: 3.3 G/DL (ref 2.8–4.4)
GLUCOSE BLD-MCNC: 101 MG/DL (ref 70–99)
HCV AB SERPL QL IA: NONREACTIVE
HDLC SERPL-MCNC: 37 MG/DL (ref 40–59)
LDLC SERPL CALC-MCNC: 43 MG/DL (ref ?–100)
M PROTEIN MFR SERPL ELPH: 7.3 G/DL (ref 6.4–8.2)
NONHDLC SERPL-MCNC: 66 MG/DL (ref ?–130)
OSMOLALITY SERPL CALC.SUM OF ELEC: 300 MOSM/KG (ref 275–295)
POTASSIUM SERPL-SCNC: 4.3 MMOL/L (ref 3.5–5.1)
SODIUM SERPL-SCNC: 143 MMOL/L (ref 136–145)
TRIGL SERPL-MCNC: 116 MG/DL (ref 30–149)
VLDLC SERPL CALC-MCNC: 23 MG/DL (ref 0–30)

## 2019-08-15 PROCEDURE — 36415 COLL VENOUS BLD VENIPUNCTURE: CPT

## 2019-08-15 PROCEDURE — 80053 COMPREHEN METABOLIC PANEL: CPT

## 2019-08-15 PROCEDURE — 86803 HEPATITIS C AB TEST: CPT

## 2019-08-15 PROCEDURE — 80061 LIPID PANEL: CPT

## 2019-10-07 DIAGNOSIS — R93.1 ABNORMAL SCREENING CARDIAC CT: ICD-10-CM

## 2019-10-07 DIAGNOSIS — I10 ESSENTIAL HYPERTENSION: ICD-10-CM

## 2019-10-07 DIAGNOSIS — E78.2 MIXED HYPERLIPIDEMIA: ICD-10-CM

## 2019-10-07 RX ORDER — LOSARTAN POTASSIUM 100 MG/1
TABLET ORAL
Qty: 90 TABLET | Refills: 0 | Status: SHIPPED | OUTPATIENT
Start: 2019-10-07 | End: 2020-01-07

## 2019-10-07 RX ORDER — ATORVASTATIN CALCIUM 20 MG/1
TABLET, FILM COATED ORAL
Qty: 90 TABLET | Refills: 0 | Status: SHIPPED | OUTPATIENT
Start: 2019-10-07 | End: 2020-01-07

## 2019-10-08 ENCOUNTER — WALK IN (OUTPATIENT)
Dept: URGENT CARE | Age: 74
End: 2019-10-08

## 2019-10-08 DIAGNOSIS — Z23 NEED FOR IMMUNIZATION AGAINST INFLUENZA: Primary | ICD-10-CM

## 2019-10-08 PROCEDURE — 90662 IIV NO PRSV INCREASED AG IM: CPT | Performed by: NURSE PRACTITIONER

## 2019-10-08 PROCEDURE — G0008 ADMIN INFLUENZA VIRUS VAC: HCPCS | Performed by: NURSE PRACTITIONER

## 2019-11-11 ENCOUNTER — HOSPITAL ENCOUNTER (OUTPATIENT)
Dept: MRI IMAGING | Facility: HOSPITAL | Age: 74
Discharge: HOME | End: 2019-11-11
Attending: INTERNAL MEDICINE
Payer: MEDICARE

## 2019-11-11 DIAGNOSIS — K76.9 LIVER LESION: ICD-10-CM

## 2019-11-11 PROCEDURE — 74183 MRI ABD W/O CNTR FLWD CNTR: CPT | Performed by: INTERNAL MEDICINE

## 2019-11-11 PROCEDURE — A9581 GADOXETATE DISODIUM INJ: HCPCS

## 2019-12-04 ENCOUNTER — TELEPHONE (OUTPATIENT)
Dept: SURGERY | Facility: CLINIC | Age: 74
End: 2019-12-04

## 2019-12-04 DIAGNOSIS — K76.9 LIVER LESION: Primary | ICD-10-CM

## 2019-12-05 NOTE — TELEPHONE ENCOUNTER
Reviewed MRI with IR, specifically the concern about slow growth of larger lesion over time. Could just be hemangioma but would be somewhat unusual course for hemangioma. Option would be further observation versus biopsy.  Discussed with patient and my incl

## 2019-12-12 ENCOUNTER — TELEPHONE (OUTPATIENT)
Dept: FAMILY MEDICINE CLINIC | Facility: CLINIC | Age: 74
End: 2019-12-12

## 2019-12-12 VITALS — BODY MASS INDEX: 28.6 KG/M2 | HEIGHT: 75 IN | WEIGHT: 230 LBS

## 2019-12-12 NOTE — TELEPHONE ENCOUNTER
LM for pt to cb. Please let him know I will call him tomorrow on this. We need to see if Monday HFU spots are available to schedule him as we have no 30 min openings prior (I checked with Dr Se Patton, he confirms we do need 30 vs 15).  I cannot use HFU spots un

## 2019-12-12 NOTE — TELEPHONE ENCOUNTER
Pt has a liver biopsy scheduled for 12/17/19 and was told to check with his PCP if he wanted to see him before this biopsy. Please advise.

## 2019-12-13 NOTE — TELEPHONE ENCOUNTER
S/w pt. Scheduled him for Monday. Request rec'd from Dr Milad Mae requesting h/p prior to u/s guided liver biopsy 12/17.

## 2019-12-16 ENCOUNTER — OFFICE VISIT (OUTPATIENT)
Dept: FAMILY MEDICINE CLINIC | Facility: CLINIC | Age: 74
End: 2019-12-16
Payer: MEDICARE

## 2019-12-16 VITALS
HEIGHT: 74.5 IN | HEART RATE: 68 BPM | WEIGHT: 240 LBS | RESPIRATION RATE: 16 BRPM | BODY MASS INDEX: 30.48 KG/M2 | DIASTOLIC BLOOD PRESSURE: 70 MMHG | TEMPERATURE: 97 F | SYSTOLIC BLOOD PRESSURE: 118 MMHG

## 2019-12-16 DIAGNOSIS — E78.2 MIXED HYPERLIPIDEMIA: ICD-10-CM

## 2019-12-16 DIAGNOSIS — I10 ESSENTIAL HYPERTENSION: ICD-10-CM

## 2019-12-16 DIAGNOSIS — Z01.818 PREPROCEDURAL EXAMINATION: Primary | ICD-10-CM

## 2019-12-16 DIAGNOSIS — D18.03 HEMANGIOMA OF LIVER: ICD-10-CM

## 2019-12-16 PROCEDURE — 99214 OFFICE O/P EST MOD 30 MIN: CPT | Performed by: FAMILY MEDICINE

## 2019-12-16 NOTE — PROGRESS NOTES
Rafaela Motley is a 69year old male who presents for a pre-procedural physical exam. Patient is to have liver biopsy, to be done by radiology at [facility] on 12/17/2019.       HPI:   Pt has a history of several liver lesions thought to be benign headaches  PSYCHE: denies depression or anxiety  HEMATOLOGIC: denies hx of anemia  ENDOCRINE: denies thyroid history  ALL/ASTHMA: denies hx of allergy or asthma    EXAM:   /70 (BP Location: Left arm, Patient Position: Sitting, Cuff Size: adult)   Pul

## 2019-12-17 ENCOUNTER — HOSPITAL ENCOUNTER (OUTPATIENT)
Dept: ULTRASOUND IMAGING | Facility: HOSPITAL | Age: 74
Discharge: HOME | End: 2019-12-17
Attending: INTERNAL MEDICINE
Payer: MEDICARE

## 2019-12-17 ENCOUNTER — APPOINTMENT (OUTPATIENT)
Dept: LAB | Facility: HOSPITAL | Age: 74
End: 2019-12-17
Attending: INTERNAL MEDICINE
Payer: MEDICARE

## 2019-12-17 VITALS
DIASTOLIC BLOOD PRESSURE: 66 MMHG | RESPIRATION RATE: 16 BRPM | HEART RATE: 67 BPM | OXYGEN SATURATION: 98 % | TEMPERATURE: 98 F | SYSTOLIC BLOOD PRESSURE: 116 MMHG

## 2019-12-17 DIAGNOSIS — K76.9 LIVER LESION: ICD-10-CM

## 2019-12-17 DIAGNOSIS — K76.9 LIVER LESION: Primary | ICD-10-CM

## 2019-12-17 PROCEDURE — 47000 NEEDLE BIOPSY OF LIVER PERQ: CPT | Performed by: INTERNAL MEDICINE

## 2019-12-17 PROCEDURE — 99153 MOD SED SAME PHYS/QHP EA: CPT | Performed by: INTERNAL MEDICINE

## 2019-12-17 PROCEDURE — 88307 TISSUE EXAM BY PATHOLOGIST: CPT | Performed by: INTERNAL MEDICINE

## 2019-12-17 PROCEDURE — 88342 IMHCHEM/IMCYTCHM 1ST ANTB: CPT | Performed by: INTERNAL MEDICINE

## 2019-12-17 PROCEDURE — 99152 MOD SED SAME PHYS/QHP 5/>YRS: CPT | Performed by: INTERNAL MEDICINE

## 2019-12-17 PROCEDURE — 76942 ECHO GUIDE FOR BIOPSY: CPT | Performed by: INTERNAL MEDICINE

## 2019-12-17 PROCEDURE — 85027 COMPLETE CBC AUTOMATED: CPT

## 2019-12-17 PROCEDURE — 36415 COLL VENOUS BLD VENIPUNCTURE: CPT

## 2019-12-17 PROCEDURE — 85610 PROTHROMBIN TIME: CPT

## 2019-12-17 RX ORDER — SODIUM CHLORIDE 9 MG/ML
INJECTION, SOLUTION INTRAVENOUS CONTINUOUS
Status: DISCONTINUED | OUTPATIENT
Start: 2019-12-17 | End: 2019-12-19

## 2019-12-17 RX ORDER — MIDAZOLAM HYDROCHLORIDE 1 MG/ML
1 INJECTION INTRAMUSCULAR; INTRAVENOUS EVERY 5 MIN PRN
Status: ACTIVE | OUTPATIENT
Start: 2019-12-17 | End: 2019-12-17

## 2019-12-17 RX ORDER — NALOXONE HYDROCHLORIDE 0.4 MG/ML
80 INJECTION, SOLUTION INTRAMUSCULAR; INTRAVENOUS; SUBCUTANEOUS AS NEEDED
Status: DISCONTINUED | OUTPATIENT
Start: 2019-12-17 | End: 2019-12-19

## 2019-12-17 RX ORDER — MIDAZOLAM HYDROCHLORIDE 1 MG/ML
INJECTION INTRAMUSCULAR; INTRAVENOUS
Status: COMPLETED
Start: 2019-12-17 | End: 2019-12-17

## 2019-12-17 RX ORDER — MIDAZOLAM HYDROCHLORIDE 1 MG/ML
INJECTION INTRAMUSCULAR; INTRAVENOUS
Status: DISPENSED
Start: 2019-12-17 | End: 2019-12-17

## 2019-12-17 RX ORDER — FLUMAZENIL 0.1 MG/ML
0.2 INJECTION, SOLUTION INTRAVENOUS AS NEEDED
Status: DISCONTINUED | OUTPATIENT
Start: 2019-12-17 | End: 2019-12-19

## 2019-12-17 RX ADMIN — MIDAZOLAM HYDROCHLORIDE 1 MG: 1 INJECTION INTRAMUSCULAR; INTRAVENOUS at 08:20:00

## 2019-12-17 RX ADMIN — SODIUM CHLORIDE 250 ML: 9 INJECTION, SOLUTION INTRAVENOUS at 07:50:00

## 2019-12-17 RX ADMIN — MIDAZOLAM HYDROCHLORIDE 1 MG: 1 INJECTION INTRAMUSCULAR; INTRAVENOUS at 08:15:00

## 2019-12-17 NOTE — IMAGING NOTE
Patient arrived to [address] [facility] with his support person for Bx of a liver lesion . The Patient's affect is good and conversational.  Doctor Sandra Hernández consented patient for the procedure, and all questions were answered.   The patient was positioned in the Supine position on

## 2019-12-23 ENCOUNTER — TELEPHONE (OUTPATIENT)
Dept: SURGERY | Facility: CLINIC | Age: 74
End: 2019-12-23

## 2019-12-23 NOTE — TELEPHONE ENCOUNTER
Discussed with KATHLEEN Lopes Roles) and with patient. Location of lesion is difficult to biopsy and while biopsy is unremarkable it is also possible that lesion could have been missed.  Discussed that there are additional lesions in liver that appear similar to larger l

## 2020-01-04 DIAGNOSIS — K76.9 LIVER LESION: Primary | ICD-10-CM

## 2020-01-06 DIAGNOSIS — E78.2 MIXED HYPERLIPIDEMIA: ICD-10-CM

## 2020-01-06 DIAGNOSIS — I10 ESSENTIAL HYPERTENSION: ICD-10-CM

## 2020-01-06 DIAGNOSIS — R93.1 ABNORMAL SCREENING CARDIAC CT: ICD-10-CM

## 2020-01-07 RX ORDER — ATORVASTATIN CALCIUM 20 MG/1
TABLET, FILM COATED ORAL
Qty: 90 TABLET | Refills: 0 | Status: SHIPPED | OUTPATIENT
Start: 2020-01-07 | End: 2020-03-30

## 2020-01-07 RX ORDER — LOSARTAN POTASSIUM 100 MG/1
TABLET ORAL
Qty: 90 TABLET | Refills: 0 | Status: SHIPPED | OUTPATIENT
Start: 2020-01-07 | End: 2020-03-30

## 2020-01-17 ENCOUNTER — HOSPITAL ENCOUNTER (OUTPATIENT)
Dept: CT IMAGING | Facility: HOSPITAL | Age: 75
Discharge: HOME | End: 2020-01-17
Attending: INTERNAL MEDICINE
Payer: MEDICARE

## 2020-01-17 ENCOUNTER — APPOINTMENT (OUTPATIENT)
Dept: LAB | Facility: HOSPITAL | Age: 75
End: 2020-01-17
Attending: INTERNAL MEDICINE
Payer: MEDICARE

## 2020-01-17 VITALS
WEIGHT: 235 LBS | RESPIRATION RATE: 16 BRPM | OXYGEN SATURATION: 100 % | TEMPERATURE: 98 F | HEART RATE: 68 BPM | HEIGHT: 75 IN | DIASTOLIC BLOOD PRESSURE: 55 MMHG | SYSTOLIC BLOOD PRESSURE: 109 MMHG | BODY MASS INDEX: 29.22 KG/M2

## 2020-01-17 DIAGNOSIS — K76.9 LIVER LESION: ICD-10-CM

## 2020-01-17 DIAGNOSIS — K76.9 LIVER LESION: Primary | ICD-10-CM

## 2020-01-17 LAB
CREAT BLD-MCNC: 1.1 MG/DL (ref 0.7–1.3)
DEPRECATED RDW RBC AUTO: 40.5 FL (ref 35.1–46.3)
ERYTHROCYTE [DISTWIDTH] IN BLOOD BY AUTOMATED COUNT: 12.4 % (ref 11–15)
HCT VFR BLD AUTO: 49.2 % (ref 39–53)
HGB BLD-MCNC: 16.7 G/DL (ref 13–17.5)
INR BLD: 0.95 (ref 0.9–1.1)
MCH RBC QN AUTO: 30.6 PG (ref 26–34)
MCHC RBC AUTO-ENTMCNC: 33.9 G/DL (ref 31–37)
MCV RBC AUTO: 90.3 FL (ref 80–100)
PLATELET # BLD AUTO: 256 10(3)UL (ref 150–450)
PSA SERPL DL<=0.01 NG/ML-MCNC: 13 SECONDS (ref 12.5–14.7)
RBC # BLD AUTO: 5.45 X10(6)UL (ref 3.8–5.8)
WBC # BLD AUTO: 7.5 X10(3) UL (ref 4–11)

## 2020-01-17 PROCEDURE — 85027 COMPLETE CBC AUTOMATED: CPT

## 2020-01-17 PROCEDURE — 36415 COLL VENOUS BLD VENIPUNCTURE: CPT

## 2020-01-17 PROCEDURE — 99152 MOD SED SAME PHYS/QHP 5/>YRS: CPT | Performed by: INTERNAL MEDICINE

## 2020-01-17 PROCEDURE — 88342 IMHCHEM/IMCYTCHM 1ST ANTB: CPT | Performed by: INTERNAL MEDICINE

## 2020-01-17 PROCEDURE — 77012 CT SCAN FOR NEEDLE BIOPSY: CPT | Performed by: INTERNAL MEDICINE

## 2020-01-17 PROCEDURE — 85610 PROTHROMBIN TIME: CPT

## 2020-01-17 PROCEDURE — 88341 IMHCHEM/IMCYTCHM EA ADD ANTB: CPT | Performed by: INTERNAL MEDICINE

## 2020-01-17 PROCEDURE — 47000 NEEDLE BIOPSY OF LIVER PERQ: CPT | Performed by: INTERNAL MEDICINE

## 2020-01-17 PROCEDURE — 82565 ASSAY OF CREATININE: CPT

## 2020-01-17 PROCEDURE — 99153 MOD SED SAME PHYS/QHP EA: CPT | Performed by: INTERNAL MEDICINE

## 2020-01-17 PROCEDURE — 88307 TISSUE EXAM BY PATHOLOGIST: CPT | Performed by: INTERNAL MEDICINE

## 2020-01-17 RX ORDER — MIDAZOLAM HYDROCHLORIDE 1 MG/ML
INJECTION INTRAMUSCULAR; INTRAVENOUS
Status: COMPLETED
Start: 2020-01-17 | End: 2020-01-17

## 2020-01-17 RX ORDER — MIDAZOLAM HYDROCHLORIDE 1 MG/ML
1 INJECTION INTRAMUSCULAR; INTRAVENOUS EVERY 5 MIN PRN
Status: DISCONTINUED | OUTPATIENT
Start: 2020-01-17 | End: 2020-01-19

## 2020-01-17 RX ORDER — SODIUM CHLORIDE 9 MG/ML
INJECTION, SOLUTION INTRAVENOUS CONTINUOUS
Status: DISCONTINUED | OUTPATIENT
Start: 2020-01-17 | End: 2020-01-19

## 2020-01-17 RX ORDER — NALOXONE HYDROCHLORIDE 0.4 MG/ML
80 INJECTION, SOLUTION INTRAMUSCULAR; INTRAVENOUS; SUBCUTANEOUS AS NEEDED
Status: DISCONTINUED | OUTPATIENT
Start: 2020-01-17 | End: 2020-01-19

## 2020-01-17 RX ORDER — FLUMAZENIL 0.1 MG/ML
0.2 INJECTION, SOLUTION INTRAVENOUS AS NEEDED
Status: DISCONTINUED | OUTPATIENT
Start: 2020-01-17 | End: 2020-01-19

## 2020-01-17 RX ADMIN — MIDAZOLAM HYDROCHLORIDE 1 MG: 1 INJECTION INTRAMUSCULAR; INTRAVENOUS at 09:38:00

## 2020-01-17 RX ADMIN — MIDAZOLAM HYDROCHLORIDE 1 MG: 1 INJECTION INTRAMUSCULAR; INTRAVENOUS at 09:34:00

## 2020-01-17 RX ADMIN — SODIUM CHLORIDE: 9 INJECTION, SOLUTION INTRAVENOUS at 09:30:00

## 2020-01-17 NOTE — H&P
Jacqueline 39 Patient Status:  Outpatient    1945 MRN [id number]   [facility] CT Attending Martínez Newman MD   Hosp Day # 0 PCP Candelaria Hicks MD     Admitting Diagnosis:   Liver lesion General: NAD  Neck: No JVD  Lungs: CTA bilat  Heart: RRR, S1, S2  Abdomen: Soft, NT/ND, BS+x4  Extremities: Warm, dry, no LE edema bilat  Pulses: 2+ bilat DP    Results:   Labs:  Recent Labs   Lab 01/17/20  0812   RBC 5.45   HGB 16.7   HCT 49.2   MCV 9

## 2020-01-17 NOTE — IMAGING NOTE
Patient arrived to CT with his wife for a repeat of a Liver Lesion biopsy. His affect is good and conversational.  An IV 22g was started in his Left hand. The procedure was explained to the patient. Dr. Gus Bhardwaj will consent.  G

## 2020-01-17 NOTE — PROCEDURES
[facility]  Procedure Note    [facility] Patient Status:  Outpatient    1945 MRN [id number]   [facility] CT Attending Dominga Moore MD   Hosp Day # 0 PCP Kyra Mata MD     Procedure: US guided core liver lesion b

## 2020-01-17 NOTE — IMAGING NOTE
Dr Betito Salgado explained procedure to patient. Patient signed consent. Time out performed. Patient tolerated procedure well. Noted Tegaderm dressing to RUQ clean and dry. Gelfoam applied by Dr Betito Salgado prior to Tegaderm Patient alert and coherent.  Denies pain or CP dis

## 2020-02-17 ENCOUNTER — LAB ENCOUNTER (OUTPATIENT)
Dept: LAB | Age: 75
End: 2020-02-17
Attending: FAMILY MEDICINE
Payer: MEDICARE

## 2020-02-17 DIAGNOSIS — N40.0 BENIGN PROSTATIC HYPERPLASIA, UNSPECIFIED WHETHER LOWER URINARY TRACT SYMPTOMS PRESENT: ICD-10-CM

## 2020-02-17 DIAGNOSIS — I10 HYPERTENSION, UNSPECIFIED TYPE: ICD-10-CM

## 2020-02-17 DIAGNOSIS — E78.5 HYPERLIPIDEMIA, UNSPECIFIED HYPERLIPIDEMIA TYPE: ICD-10-CM

## 2020-02-17 LAB
ALBUMIN SERPL-MCNC: 3.7 G/DL (ref 3.4–5)
ALBUMIN/GLOB SERPL: 1.1 {RATIO} (ref 1–2)
ALP LIVER SERPL-CCNC: 55 U/L (ref 45–117)
ALT SERPL-CCNC: 30 U/L (ref 16–61)
ANION GAP SERPL CALC-SCNC: 3 MMOL/L (ref 0–18)
AST SERPL-CCNC: 16 U/L (ref 15–37)
BASOPHILS # BLD AUTO: 0.03 X10(3) UL (ref 0–0.2)
BASOPHILS NFR BLD AUTO: 0.4 %
BILIRUB SERPL-MCNC: 0.5 MG/DL (ref 0.1–2)
BUN BLD-MCNC: 19 MG/DL (ref 7–18)
BUN/CREAT SERPL: 17.4 (ref 10–20)
CALCIUM BLD-MCNC: 9.3 MG/DL (ref 8.5–10.1)
CHLORIDE SERPL-SCNC: 108 MMOL/L (ref 98–112)
CHOLEST SMN-MCNC: 114 MG/DL (ref ?–200)
CO2 SERPL-SCNC: 28 MMOL/L (ref 21–32)
CREAT BLD-MCNC: 1.09 MG/DL (ref 0.7–1.3)
DEPRECATED RDW RBC AUTO: 42.5 FL (ref 35.1–46.3)
EOSINOPHIL # BLD AUTO: 0.3 X10(3) UL (ref 0–0.7)
EOSINOPHIL NFR BLD AUTO: 4.3 %
ERYTHROCYTE [DISTWIDTH] IN BLOOD BY AUTOMATED COUNT: 12.7 % (ref 11–15)
GLOBULIN PLAS-MCNC: 3.5 G/DL (ref 2.8–4.4)
GLUCOSE BLD-MCNC: 93 MG/DL (ref 70–99)
HCT VFR BLD AUTO: 50.2 % (ref 39–53)
HDLC SERPL-MCNC: 38 MG/DL (ref 40–59)
HGB BLD-MCNC: 16.2 G/DL (ref 13–17.5)
IMM GRANULOCYTES # BLD AUTO: 0.02 X10(3) UL (ref 0–1)
IMM GRANULOCYTES NFR BLD: 0.3 %
LDLC SERPL CALC-MCNC: 45 MG/DL (ref ?–100)
LYMPHOCYTES # BLD AUTO: 2.57 X10(3) UL (ref 1–4)
LYMPHOCYTES NFR BLD AUTO: 36.7 %
M PROTEIN MFR SERPL ELPH: 7.2 G/DL (ref 6.4–8.2)
MCH RBC QN AUTO: 29.6 PG (ref 26–34)
MCHC RBC AUTO-ENTMCNC: 32.3 G/DL (ref 31–37)
MCV RBC AUTO: 91.8 FL (ref 80–100)
MONOCYTES # BLD AUTO: 0.65 X10(3) UL (ref 0.1–1)
MONOCYTES NFR BLD AUTO: 9.3 %
NEUTROPHILS # BLD AUTO: 3.44 X10 (3) UL (ref 1.5–7.7)
NEUTROPHILS # BLD AUTO: 3.44 X10(3) UL (ref 1.5–7.7)
NEUTROPHILS NFR BLD AUTO: 49 %
NONHDLC SERPL-MCNC: 76 MG/DL (ref ?–130)
OSMOLALITY SERPL CALC.SUM OF ELEC: 290 MOSM/KG (ref 275–295)
PATIENT FASTING Y/N/NP: YES
PATIENT FASTING Y/N/NP: YES
PLATELET # BLD AUTO: 287 10(3)UL (ref 150–450)
POTASSIUM SERPL-SCNC: 4.3 MMOL/L (ref 3.5–5.1)
PSA FREE MFR SERPL: 30 %
PSA FREE SERPL-MCNC: 5 NG/ML
PSA SERPL-MCNC: 16.2 NG/ML (ref ?–4)
RBC # BLD AUTO: 5.47 X10(6)UL (ref 3.8–5.8)
SODIUM SERPL-SCNC: 139 MMOL/L (ref 136–145)
TRIGL SERPL-MCNC: 157 MG/DL (ref 30–149)
VLDLC SERPL CALC-MCNC: 31 MG/DL (ref 0–30)
WBC # BLD AUTO: 7 X10(3) UL (ref 4–11)

## 2020-02-17 PROCEDURE — 85025 COMPLETE CBC W/AUTO DIFF WBC: CPT

## 2020-02-17 PROCEDURE — 80061 LIPID PANEL: CPT

## 2020-02-17 PROCEDURE — 84154 ASSAY OF PSA FREE: CPT

## 2020-02-17 PROCEDURE — 84153 ASSAY OF PSA TOTAL: CPT

## 2020-02-17 PROCEDURE — 80053 COMPREHEN METABOLIC PANEL: CPT

## 2020-02-17 PROCEDURE — 36415 COLL VENOUS BLD VENIPUNCTURE: CPT

## 2020-02-18 DIAGNOSIS — I10 ESSENTIAL HYPERTENSION: ICD-10-CM

## 2020-02-18 DIAGNOSIS — E78.2 MIXED HYPERLIPIDEMIA: Primary | ICD-10-CM

## 2020-03-30 DIAGNOSIS — I10 ESSENTIAL HYPERTENSION: ICD-10-CM

## 2020-03-30 DIAGNOSIS — E78.2 MIXED HYPERLIPIDEMIA: ICD-10-CM

## 2020-03-30 DIAGNOSIS — R93.1 ABNORMAL SCREENING CARDIAC CT: ICD-10-CM

## 2020-03-30 RX ORDER — LOSARTAN POTASSIUM 100 MG/1
TABLET ORAL
Qty: 90 TABLET | Refills: 0 | Status: SHIPPED | OUTPATIENT
Start: 2020-03-30 | End: 2020-07-02

## 2020-03-30 RX ORDER — ATORVASTATIN CALCIUM 20 MG/1
TABLET, FILM COATED ORAL
Qty: 90 TABLET | Refills: 0 | Status: SHIPPED | OUTPATIENT
Start: 2020-03-30 | End: 2020-07-02

## 2020-03-30 NOTE — TELEPHONE ENCOUNTER
Received request for a refill of Losartan 100mg tabs. Last OV 7/29/19, (had H&P 12/16/19), last refill 1/7/2020 #90.

## 2020-07-02 ENCOUNTER — TELEPHONE (OUTPATIENT)
Dept: FAMILY MEDICINE CLINIC | Facility: CLINIC | Age: 75
End: 2020-07-02

## 2020-07-02 DIAGNOSIS — E78.2 MIXED HYPERLIPIDEMIA: ICD-10-CM

## 2020-07-02 DIAGNOSIS — I10 ESSENTIAL HYPERTENSION: ICD-10-CM

## 2020-07-02 DIAGNOSIS — R93.1 ABNORMAL SCREENING CARDIAC CT: ICD-10-CM

## 2020-07-02 RX ORDER — LOSARTAN POTASSIUM 100 MG/1
TABLET ORAL
Qty: 90 TABLET | Refills: 0 | Status: SHIPPED | OUTPATIENT
Start: 2020-07-02 | End: 2020-10-05

## 2020-07-02 RX ORDER — ATORVASTATIN CALCIUM 20 MG/1
TABLET, FILM COATED ORAL
Qty: 90 TABLET | Refills: 0 | Status: SHIPPED | OUTPATIENT
Start: 2020-07-02 | End: 2020-10-05

## 2020-07-29 ENCOUNTER — TELEPHONE (OUTPATIENT)
Dept: FAMILY MEDICINE CLINIC | Facility: CLINIC | Age: 75
End: 2020-07-29

## 2020-07-29 NOTE — TELEPHONE ENCOUNTER
Medical Records Request received from [facility] for records from the past 5 years. Release original sent to [employer] on 7/29/2020. Copy sent to scanning.

## 2020-08-03 ENCOUNTER — OFFICE VISIT (OUTPATIENT)
Dept: FAMILY MEDICINE CLINIC | Facility: CLINIC | Age: 75
End: 2020-08-03
Payer: COMMERCIAL

## 2020-08-03 VITALS
SYSTOLIC BLOOD PRESSURE: 118 MMHG | RESPIRATION RATE: 16 BRPM | HEIGHT: 75 IN | HEART RATE: 72 BPM | BODY MASS INDEX: 29.22 KG/M2 | TEMPERATURE: 98 F | DIASTOLIC BLOOD PRESSURE: 76 MMHG | WEIGHT: 235 LBS

## 2020-08-03 DIAGNOSIS — R97.20 ELEVATED PSA, BETWEEN 10 AND LESS THAN 20 NG/ML: ICD-10-CM

## 2020-08-03 DIAGNOSIS — I10 ESSENTIAL HYPERTENSION: ICD-10-CM

## 2020-08-03 DIAGNOSIS — E78.2 MIXED HYPERLIPIDEMIA: ICD-10-CM

## 2020-08-03 DIAGNOSIS — Z00.00 ENCOUNTER FOR ANNUAL HEALTH EXAMINATION: Primary | ICD-10-CM

## 2020-08-03 PROCEDURE — 3008F BODY MASS INDEX DOCD: CPT | Performed by: FAMILY MEDICINE

## 2020-08-03 PROCEDURE — 3074F SYST BP LT 130 MM HG: CPT | Performed by: FAMILY MEDICINE

## 2020-08-03 PROCEDURE — 99397 PER PM REEVAL EST PAT 65+ YR: CPT | Performed by: FAMILY MEDICINE

## 2020-08-03 PROCEDURE — 96160 PT-FOCUSED HLTH RISK ASSMT: CPT | Performed by: FAMILY MEDICINE

## 2020-08-03 PROCEDURE — 3078F DIAST BP <80 MM HG: CPT | Performed by: FAMILY MEDICINE

## 2020-08-03 PROCEDURE — G0439 PPPS, SUBSEQ VISIT: HCPCS | Performed by: FAMILY MEDICINE

## 2020-08-03 NOTE — PROGRESS NOTES
HPI:   Bushra Antonio is a 77year old male who presents for a MA (Medicare Advantage) [address] [facility]note 2[facility] (Once per calendar year). Patient states he has no new complaints. He is seen by urology for his elevated PSA.   Patient is here for follow-up of h prostatic hyperplasia)     Screening, anemia, deficiency, iron     Elevated PSA, between 10 and less than 20 ng/ml     Calculus of gallbladder with chronic cholecystitis without obstruction     Preprocedural examination    Wt Readings from Last 3 Encounter congestion, sinus pain or ST  LUNGS: denies shortness of breath with exertion  CARDIOVASCULAR: denies chest pain on exertion  GI: denies abdominal pain, denies heartburn  : 1 per night nocturia, no complaint of urinary incontinence  MUSCULOSKELETAL: emir trachea midline, no adenopathy, thyroid: not enlarged, symmetric, no tenderness/mass/nodules, no carotid bruit or JVD   Back:   Symmetric, no curvature, ROM normal, no CVA tenderness   Lungs:   Clear to auscultation bilaterally, respirations unlabored   Ch lifestyle, and exercise. No follow-ups on file.      Omar Shah MD, 8/3/2020    General Health     In the past six months, have you lost more than 10 pounds without trying?: 2 - No  Has your appetite been poor?: No  How does the patient maintain applicable)     Influenza  Covered Annually    Please get every year    Pneumococcal 13 (Prevnar)  Covered Once after 65 No vaccine history found Please get once after your 65th birthday    Pneumococcal 23 (Pneumovax)  Covered Once after 65 No vaccine hist

## 2020-09-30 ENCOUNTER — LAB ENCOUNTER (OUTPATIENT)
Dept: LAB | Age: 75
End: 2020-09-30
Attending: FAMILY MEDICINE
Payer: MEDICARE

## 2020-09-30 DIAGNOSIS — E78.2 MIXED HYPERLIPIDEMIA: ICD-10-CM

## 2020-09-30 DIAGNOSIS — I10 ESSENTIAL HYPERTENSION: ICD-10-CM

## 2020-09-30 PROCEDURE — 80061 LIPID PANEL: CPT

## 2020-09-30 PROCEDURE — 80053 COMPREHEN METABOLIC PANEL: CPT

## 2020-09-30 PROCEDURE — 36415 COLL VENOUS BLD VENIPUNCTURE: CPT

## 2020-10-02 DIAGNOSIS — N40.1 BENIGN PROSTATIC HYPERPLASIA WITH URINARY HESITANCY: Primary | ICD-10-CM

## 2020-10-02 DIAGNOSIS — E78.1 PURE HYPERTRIGLYCERIDEMIA: ICD-10-CM

## 2020-10-02 DIAGNOSIS — R39.11 BENIGN PROSTATIC HYPERPLASIA WITH URINARY HESITANCY: Primary | ICD-10-CM

## 2020-10-02 DIAGNOSIS — I10 ESSENTIAL HYPERTENSION: ICD-10-CM

## 2020-10-03 DIAGNOSIS — E78.2 MIXED HYPERLIPIDEMIA: ICD-10-CM

## 2020-10-03 DIAGNOSIS — I10 ESSENTIAL HYPERTENSION: ICD-10-CM

## 2020-10-03 DIAGNOSIS — R93.1 ABNORMAL SCREENING CARDIAC CT: ICD-10-CM

## 2020-10-05 RX ORDER — ATORVASTATIN CALCIUM 20 MG/1
TABLET, FILM COATED ORAL
Qty: 90 TABLET | Refills: 0 | Status: SHIPPED | OUTPATIENT
Start: 2020-10-05 | End: 2020-12-28

## 2020-10-05 RX ORDER — LOSARTAN POTASSIUM 100 MG/1
TABLET ORAL
Qty: 90 TABLET | Refills: 0 | Status: SHIPPED | OUTPATIENT
Start: 2020-10-05 | End: 2020-12-28

## 2020-10-19 ENCOUNTER — TELEPHONE (OUTPATIENT)
Dept: FAMILY MEDICINE CLINIC | Facility: CLINIC | Age: 75
End: 2020-10-19

## 2020-10-19 DIAGNOSIS — Z20.822 CLOSE EXPOSURE TO COVID-19 VIRUS: Primary | ICD-10-CM

## 2020-10-19 NOTE — TELEPHONE ENCOUNTER
Pt states his son tested positive for COVID over the weekend. Last contact was last Wednesday. Pt is having a stuffy nose but no other symptoms. Pt would like to get tested. Please advise. Thank you.

## 2020-10-19 NOTE — TELEPHONE ENCOUNTER
Pt notified COVID test was ordered. He was given the number for [facility] scheduling to call and make an appt.

## 2020-10-20 ENCOUNTER — APPOINTMENT (OUTPATIENT)
Dept: LAB | Age: 75
End: 2020-10-20
Attending: FAMILY MEDICINE
Payer: MEDICARE

## 2020-10-20 DIAGNOSIS — Z20.822 CLOSE EXPOSURE TO COVID-19 VIRUS: ICD-10-CM

## 2020-12-28 DIAGNOSIS — R93.1 ABNORMAL SCREENING CARDIAC CT: ICD-10-CM

## 2020-12-28 DIAGNOSIS — I10 ESSENTIAL HYPERTENSION: ICD-10-CM

## 2020-12-28 DIAGNOSIS — E78.2 MIXED HYPERLIPIDEMIA: ICD-10-CM

## 2020-12-28 RX ORDER — ATORVASTATIN CALCIUM 20 MG/1
TABLET, FILM COATED ORAL
Qty: 90 TABLET | Refills: 0 | Status: SHIPPED | OUTPATIENT
Start: 2020-12-28 | End: 2021-03-23

## 2020-12-28 RX ORDER — LOSARTAN POTASSIUM 100 MG/1
TABLET ORAL
Qty: 90 TABLET | Refills: 0 | Status: SHIPPED | OUTPATIENT
Start: 2020-12-28 | End: 2021-03-23

## 2021-03-08 ENCOUNTER — LAB ENCOUNTER (OUTPATIENT)
Dept: LAB | Age: 76
End: 2021-03-08
Attending: FAMILY MEDICINE
Payer: MEDICARE

## 2021-03-08 ENCOUNTER — HOSPITAL ENCOUNTER (OUTPATIENT)
Dept: ULTRASOUND IMAGING | Age: 76
Discharge: HOME | End: 2021-03-08
Attending: FAMILY MEDICINE
Payer: MEDICARE

## 2021-03-08 ENCOUNTER — TELEPHONE (OUTPATIENT)
Dept: FAMILY MEDICINE CLINIC | Facility: CLINIC | Age: 76
End: 2021-03-08

## 2021-03-08 ENCOUNTER — OFFICE VISIT (OUTPATIENT)
Dept: FAMILY MEDICINE CLINIC | Facility: CLINIC | Age: 76
End: 2021-03-08
Payer: COMMERCIAL

## 2021-03-08 VITALS
HEIGHT: 75 IN | HEART RATE: 76 BPM | WEIGHT: 233 LBS | TEMPERATURE: 98 F | RESPIRATION RATE: 16 BRPM | SYSTOLIC BLOOD PRESSURE: 126 MMHG | BODY MASS INDEX: 28.97 KG/M2 | DIASTOLIC BLOOD PRESSURE: 70 MMHG

## 2021-03-08 DIAGNOSIS — K80.20 CALCULUS OF GALLBLADDER WITHOUT CHOLECYSTITIS WITHOUT OBSTRUCTION: ICD-10-CM

## 2021-03-08 DIAGNOSIS — R10.11 RUQ ABDOMINAL PAIN: ICD-10-CM

## 2021-03-08 DIAGNOSIS — R10.11 RUQ ABDOMINAL PAIN: Primary | ICD-10-CM

## 2021-03-08 LAB
ALBUMIN SERPL-MCNC: 3.9 G/DL (ref 3.4–5)
ALBUMIN/GLOB SERPL: 1 {RATIO} (ref 1–2)
ALP LIVER SERPL-CCNC: 66 U/L
ALT SERPL-CCNC: 32 U/L
ANION GAP SERPL CALC-SCNC: 6 MMOL/L (ref 0–18)
AST SERPL-CCNC: 14 U/L (ref 15–37)
BASOPHILS # BLD AUTO: 0.05 X10(3) UL (ref 0–0.2)
BASOPHILS NFR BLD AUTO: 0.7 %
BILIRUB SERPL-MCNC: 0.7 MG/DL (ref 0.1–2)
BUN BLD-MCNC: 14 MG/DL (ref 7–18)
BUN/CREAT SERPL: 15.2 (ref 10–20)
CALCIUM BLD-MCNC: 9.9 MG/DL (ref 8.5–10.1)
CHLORIDE SERPL-SCNC: 106 MMOL/L (ref 98–112)
CO2 SERPL-SCNC: 30 MMOL/L (ref 21–32)
CREAT BLD-MCNC: 0.92 MG/DL
DEPRECATED RDW RBC AUTO: 40.8 FL (ref 35.1–46.3)
EOSINOPHIL # BLD AUTO: 0.29 X10(3) UL (ref 0–0.7)
EOSINOPHIL NFR BLD AUTO: 4 %
ERYTHROCYTE [DISTWIDTH] IN BLOOD BY AUTOMATED COUNT: 12.6 % (ref 11–15)
GLOBULIN PLAS-MCNC: 3.8 G/DL (ref 2.8–4.4)
GLUCOSE BLD-MCNC: 89 MG/DL (ref 70–99)
HCT VFR BLD AUTO: 50.8 %
HGB BLD-MCNC: 16.7 G/DL
IMM GRANULOCYTES # BLD AUTO: 0.03 X10(3) UL (ref 0–1)
IMM GRANULOCYTES NFR BLD: 0.4 %
LYMPHOCYTES # BLD AUTO: 2.25 X10(3) UL (ref 1–4)
LYMPHOCYTES NFR BLD AUTO: 31.3 %
M PROTEIN MFR SERPL ELPH: 7.7 G/DL (ref 6.4–8.2)
MCH RBC QN AUTO: 29.1 PG (ref 26–34)
MCHC RBC AUTO-ENTMCNC: 32.9 G/DL (ref 31–37)
MCV RBC AUTO: 88.7 FL
MONOCYTES # BLD AUTO: 0.56 X10(3) UL (ref 0.1–1)
MONOCYTES NFR BLD AUTO: 7.8 %
NEUTROPHILS # BLD AUTO: 4.01 X10 (3) UL (ref 1.5–7.7)
NEUTROPHILS # BLD AUTO: 4.01 X10(3) UL (ref 1.5–7.7)
NEUTROPHILS NFR BLD AUTO: 55.8 %
OSMOLALITY SERPL CALC.SUM OF ELEC: 294 MOSM/KG (ref 275–295)
PATIENT FASTING Y/N/NP: NO
PLATELET # BLD AUTO: 302 10(3)UL (ref 150–450)
POTASSIUM SERPL-SCNC: 4.7 MMOL/L (ref 3.5–5.1)
RBC # BLD AUTO: 5.73 X10(6)UL
SODIUM SERPL-SCNC: 142 MMOL/L (ref 136–145)
WBC # BLD AUTO: 7.2 X10(3) UL (ref 4–11)

## 2021-03-08 PROCEDURE — 76700 US EXAM ABDOM COMPLETE: CPT | Performed by: FAMILY MEDICINE

## 2021-03-08 PROCEDURE — 36415 COLL VENOUS BLD VENIPUNCTURE: CPT

## 2021-03-08 PROCEDURE — 85025 COMPLETE CBC W/AUTO DIFF WBC: CPT

## 2021-03-08 PROCEDURE — 99213 OFFICE O/P EST LOW 20 MIN: CPT | Performed by: FAMILY MEDICINE

## 2021-03-08 PROCEDURE — 3074F SYST BP LT 130 MM HG: CPT | Performed by: FAMILY MEDICINE

## 2021-03-08 PROCEDURE — 3008F BODY MASS INDEX DOCD: CPT | Performed by: FAMILY MEDICINE

## 2021-03-08 PROCEDURE — 80053 COMPREHEN METABOLIC PANEL: CPT

## 2021-03-08 PROCEDURE — 3078F DIAST BP <80 MM HG: CPT | Performed by: FAMILY MEDICINE

## 2021-03-08 NOTE — PROGRESS NOTES
HPI/Subjective:   Onur Garza is a 77year old male who presents for Abdominal Pain (RUQ pain since last weds, pretty consistent but intensity is lessening, no n/v/d/f)     History as stated above.   Patient has been having a colicky right upper quad Diagnoses and all orders for this visit:    RUQ abdominal pain  -     CBC WITH DIFFERENTIAL WITH PLATELET; Future  -     COMP METABOLIC PANEL (14); Future  -     Cancel: US ABDOMEN COMPLETE (CPT=76700); Future  -     US ABDOMEN COMPLETE (CPT=76700);  Futu

## 2021-03-08 NOTE — TELEPHONE ENCOUNTER
Pt has had RUQ abd pain x 4 days. No fever and non N/V. He does feel like his symptoms are improving. Pt has had gallbladder issues in the past. I informed pt he may need an ultrasound of the abdomen to rule out gallbladder.  He prefers to see Dr. Saad Tanner

## 2021-03-08 NOTE — TELEPHONE ENCOUNTER
1. What are your symptoms? Sharp pains on right side-no fever no nausea-no vomiting no diarrhea    2. How long have you been having these symptoms? 4 days    3. Have you done anything already to treat your symptoms?          ADDITIONAL INFO:

## 2021-03-09 ENCOUNTER — TELEPHONE (OUTPATIENT)
Dept: FAMILY MEDICINE CLINIC | Facility: CLINIC | Age: 76
End: 2021-03-09

## 2021-03-09 DIAGNOSIS — K80.20 CALCULUS OF GALLBLADDER WITHOUT CHOLECYSTITIS WITHOUT OBSTRUCTION: ICD-10-CM

## 2021-03-09 DIAGNOSIS — R10.11 RUQ ABDOMINAL PAIN: Primary | ICD-10-CM

## 2021-03-10 NOTE — H&P (VIEW-ONLY)
Rebecca Solis is a 77year old male  Patient presents with:  Consult: Ref: Dr. Alfredo Canal cholelithiasis      C/O ruq pain postprandially, pain radiating to the back.   Pain has gotten progressively worse  Gallstones confirmed on ultrasound and mri  H/o soft, nondistended; no HSM, no masses, deep RUQ/epigastric tenderness  LYMPHATIC: no lymphadenopathy  EXTREMITIES: no cyanosis, clubbing or edema      U/S:gallbladder filled with stones  Mri liver and path report reviewed with patient-benign    Imp:  Sympt

## 2021-03-12 ENCOUNTER — LAB ENCOUNTER (OUTPATIENT)
Dept: LAB | Age: 76
End: 2021-03-12
Attending: SURGERY
Payer: MEDICARE

## 2021-03-12 ENCOUNTER — EKG ENCOUNTER (OUTPATIENT)
Dept: LAB | Age: 76
End: 2021-03-12
Payer: MEDICARE

## 2021-03-12 DIAGNOSIS — K81.9 CHOLECYSTITIS: ICD-10-CM

## 2021-03-12 LAB
ATRIAL RATE: 60 BPM
P AXIS: 26 DEGREES
P-R INTERVAL: 188 MS
Q-T INTERVAL: 412 MS
QRS DURATION: 140 MS
QTC CALCULATION (BEZET): 412 MS
R AXIS: -62 DEGREES
T AXIS: 42 DEGREES
VENTRICULAR RATE: 60 BPM

## 2021-03-12 PROCEDURE — 93010 ELECTROCARDIOGRAM REPORT: CPT | Performed by: INTERNAL MEDICINE

## 2021-03-12 PROCEDURE — 93005 ELECTROCARDIOGRAM TRACING: CPT

## 2021-03-13 LAB — SARS-COV-2 RNA RESP QL NAA+PROBE: NOT DETECTED

## 2021-03-15 ENCOUNTER — ANESTHESIA (OUTPATIENT)
Dept: SURGERY | Facility: HOSPITAL | Age: 76
End: 2021-03-15
Payer: MEDICARE

## 2021-03-15 ENCOUNTER — HOSPITAL ENCOUNTER (OUTPATIENT)
Facility: HOSPITAL | Age: 76
Setting detail: HOSPITAL OUTPATIENT SURGERY
Discharge: HOME | End: 2021-03-15
Attending: SURGERY | Admitting: SURGERY
Payer: MEDICARE

## 2021-03-15 ENCOUNTER — ANESTHESIA EVENT (OUTPATIENT)
Dept: SURGERY | Facility: HOSPITAL | Age: 76
End: 2021-03-15
Payer: MEDICARE

## 2021-03-15 VITALS
SYSTOLIC BLOOD PRESSURE: 112 MMHG | DIASTOLIC BLOOD PRESSURE: 62 MMHG | WEIGHT: 233.94 LBS | HEIGHT: 75 IN | OXYGEN SATURATION: 97 % | TEMPERATURE: 98 F | BODY MASS INDEX: 29.09 KG/M2 | RESPIRATION RATE: 16 BRPM | HEART RATE: 61 BPM

## 2021-03-15 DIAGNOSIS — K81.9 CHOLECYSTITIS: Primary | ICD-10-CM

## 2021-03-15 PROCEDURE — 88304 TISSUE EXAM BY PATHOLOGIST: CPT | Performed by: SURGERY

## 2021-03-15 PROCEDURE — 0FT44ZZ RESECTION OF GALLBLADDER, PERCUTANEOUS ENDOSCOPIC APPROACH: ICD-10-PCS | Performed by: SURGERY

## 2021-03-15 RX ORDER — ONDANSETRON 2 MG/ML
4 INJECTION INTRAMUSCULAR; INTRAVENOUS AS NEEDED
Status: DISCONTINUED | OUTPATIENT
Start: 2021-03-15 | End: 2021-03-15

## 2021-03-15 RX ORDER — MEPERIDINE HYDROCHLORIDE 25 MG/ML
12.5 INJECTION INTRAMUSCULAR; INTRAVENOUS; SUBCUTANEOUS AS NEEDED
Status: DISCONTINUED | OUTPATIENT
Start: 2021-03-15 | End: 2021-03-15

## 2021-03-15 RX ORDER — BUPIVACAINE HYDROCHLORIDE 5 MG/ML
INJECTION, SOLUTION EPIDURAL; INTRACAUDAL AS NEEDED
Status: DISCONTINUED | OUTPATIENT
Start: 2021-03-15 | End: 2021-03-15 | Stop reason: HOSPADM

## 2021-03-15 RX ORDER — LIDOCAINE HYDROCHLORIDE AND EPINEPHRINE 10; 10 MG/ML; UG/ML
INJECTION, SOLUTION INFILTRATION; PERINEURAL AS NEEDED
Status: DISCONTINUED | OUTPATIENT
Start: 2021-03-15 | End: 2021-03-15 | Stop reason: HOSPADM

## 2021-03-15 RX ORDER — ONDANSETRON 2 MG/ML
INJECTION INTRAMUSCULAR; INTRAVENOUS AS NEEDED
Status: DISCONTINUED | OUTPATIENT
Start: 2021-03-15 | End: 2021-03-15 | Stop reason: SURG

## 2021-03-15 RX ORDER — NALOXONE HYDROCHLORIDE 0.4 MG/ML
80 INJECTION, SOLUTION INTRAMUSCULAR; INTRAVENOUS; SUBCUTANEOUS AS NEEDED
Status: DISCONTINUED | OUTPATIENT
Start: 2021-03-15 | End: 2021-03-15

## 2021-03-15 RX ORDER — DIPHENHYDRAMINE HYDROCHLORIDE 50 MG/ML
12.5 INJECTION INTRAMUSCULAR; INTRAVENOUS AS NEEDED
Status: DISCONTINUED | OUTPATIENT
Start: 2021-03-15 | End: 2021-03-15

## 2021-03-15 RX ORDER — METOCLOPRAMIDE HYDROCHLORIDE 5 MG/ML
10 INJECTION INTRAMUSCULAR; INTRAVENOUS AS NEEDED
Status: DISCONTINUED | OUTPATIENT
Start: 2021-03-15 | End: 2021-03-15

## 2021-03-15 RX ORDER — DEXAMETHASONE SODIUM PHOSPHATE 4 MG/ML
VIAL (ML) INJECTION AS NEEDED
Status: DISCONTINUED | OUTPATIENT
Start: 2021-03-15 | End: 2021-03-15 | Stop reason: SURG

## 2021-03-15 RX ORDER — HYDROCODONE BITARTRATE AND ACETAMINOPHEN 5; 325 MG/1; MG/1
1 TABLET ORAL AS NEEDED
Status: DISCONTINUED | OUTPATIENT
Start: 2021-03-15 | End: 2021-03-15

## 2021-03-15 RX ORDER — HYDROCODONE BITARTRATE AND ACETAMINOPHEN 5; 325 MG/1; MG/1
1 TABLET ORAL EVERY 4 HOURS PRN
Qty: 20 TABLET | Refills: 0 | Status: SHIPPED | OUTPATIENT
Start: 2021-03-15 | End: 2021-03-23

## 2021-03-15 RX ORDER — NEOSTIGMINE METHYLSULFATE 1 MG/ML
INJECTION INTRAVENOUS AS NEEDED
Status: DISCONTINUED | OUTPATIENT
Start: 2021-03-15 | End: 2021-03-15 | Stop reason: SURG

## 2021-03-15 RX ORDER — SODIUM CHLORIDE, SODIUM LACTATE, POTASSIUM CHLORIDE, CALCIUM CHLORIDE 600; 310; 30; 20 MG/100ML; MG/100ML; MG/100ML; MG/100ML
INJECTION, SOLUTION INTRAVENOUS CONTINUOUS
Status: DISCONTINUED | OUTPATIENT
Start: 2021-03-15 | End: 2021-03-15

## 2021-03-15 RX ORDER — KETOROLAC TROMETHAMINE 30 MG/ML
INJECTION, SOLUTION INTRAMUSCULAR; INTRAVENOUS AS NEEDED
Status: DISCONTINUED | OUTPATIENT
Start: 2021-03-15 | End: 2021-03-15 | Stop reason: SURG

## 2021-03-15 RX ORDER — GLYCOPYRROLATE 0.2 MG/ML
INJECTION, SOLUTION INTRAMUSCULAR; INTRAVENOUS AS NEEDED
Status: DISCONTINUED | OUTPATIENT
Start: 2021-03-15 | End: 2021-03-15 | Stop reason: SURG

## 2021-03-15 RX ORDER — LIDOCAINE HYDROCHLORIDE 10 MG/ML
INJECTION, SOLUTION EPIDURAL; INFILTRATION; INTRACAUDAL; PERINEURAL AS NEEDED
Status: DISCONTINUED | OUTPATIENT
Start: 2021-03-15 | End: 2021-03-15 | Stop reason: SURG

## 2021-03-15 RX ORDER — HYDROMORPHONE HYDROCHLORIDE 1 MG/ML
0.4 INJECTION, SOLUTION INTRAMUSCULAR; INTRAVENOUS; SUBCUTANEOUS EVERY 5 MIN PRN
Status: DISCONTINUED | OUTPATIENT
Start: 2021-03-15 | End: 2021-03-15

## 2021-03-15 RX ORDER — EPHEDRINE SULFATE 50 MG/ML
INJECTION INTRAVENOUS AS NEEDED
Status: DISCONTINUED | OUTPATIENT
Start: 2021-03-15 | End: 2021-03-15 | Stop reason: SURG

## 2021-03-15 RX ORDER — ROCURONIUM BROMIDE 10 MG/ML
INJECTION, SOLUTION INTRAVENOUS AS NEEDED
Status: DISCONTINUED | OUTPATIENT
Start: 2021-03-15 | End: 2021-03-15 | Stop reason: SURG

## 2021-03-15 RX ORDER — ACETAMINOPHEN 500 MG
1000 TABLET ORAL ONCE
Status: DISCONTINUED | OUTPATIENT
Start: 2021-03-15 | End: 2021-03-15 | Stop reason: HOSPADM

## 2021-03-15 RX ORDER — HYDROCODONE BITARTRATE AND ACETAMINOPHEN 5; 325 MG/1; MG/1
2 TABLET ORAL AS NEEDED
Status: DISCONTINUED | OUTPATIENT
Start: 2021-03-15 | End: 2021-03-15

## 2021-03-15 RX ORDER — ACETAMINOPHEN 500 MG
1000 TABLET ORAL ONCE
COMMUNITY
End: 2022-01-18 | Stop reason: ALTCHOICE

## 2021-03-15 RX ADMIN — NEOSTIGMINE METHYLSULFATE 3 MG: 1 INJECTION INTRAVENOUS at 10:53:00

## 2021-03-15 RX ADMIN — KETOROLAC TROMETHAMINE 15 MG: 30 INJECTION, SOLUTION INTRAMUSCULAR; INTRAVENOUS at 10:54:00

## 2021-03-15 RX ADMIN — GLYCOPYRROLATE 0.4 MG: 0.2 INJECTION, SOLUTION INTRAMUSCULAR; INTRAVENOUS at 10:53:00

## 2021-03-15 RX ADMIN — ROCURONIUM BROMIDE 40 MG: 10 INJECTION, SOLUTION INTRAVENOUS at 10:11:00

## 2021-03-15 RX ADMIN — EPHEDRINE SULFATE 10 MG: 50 INJECTION INTRAVENOUS at 10:37:00

## 2021-03-15 RX ADMIN — LIDOCAINE HYDROCHLORIDE 50 MG: 10 INJECTION, SOLUTION EPIDURAL; INFILTRATION; INTRACAUDAL; PERINEURAL at 10:11:00

## 2021-03-15 RX ADMIN — DEXAMETHASONE SODIUM PHOSPHATE 8 MG: 4 MG/ML VIAL (ML) INJECTION at 10:19:00

## 2021-03-15 RX ADMIN — ONDANSETRON 4 MG: 2 INJECTION INTRAMUSCULAR; INTRAVENOUS at 10:53:00

## 2021-03-15 RX ADMIN — SODIUM CHLORIDE, SODIUM LACTATE, POTASSIUM CHLORIDE, CALCIUM CHLORIDE: 600; 310; 30; 20 INJECTION, SOLUTION INTRAVENOUS at 11:10:00

## 2021-03-15 NOTE — ANESTHESIA POSTPROCEDURE EVALUATION
[address] Patient Status:  Hospital Outpatient Surgery   Age/Gender 77year old male MRN [id number]   [facility] SURGERY Attending Taylor Albert, [address] [facility] [address] [facility] [address] Day # 0 PCP Kyra Mata MD       Anesthesia Post-op

## 2021-03-15 NOTE — OPERATIVE REPORT
OPERATIVE REPORT   PREOPERATIVE DIAGNOSIS:  CHOLECYSTITIS     POSTOPERATIVE DIAGNOSIS: CHOLECYSTITIS    PROCEDURE PERFORMED: Laparoscopic cholecystectomy. ASSISTANT:  NANO Sweeney    INTRAOPERATIVE FINDINGS: Cholelithiasis, cholecystitis.    DESCRIPTION OF

## 2021-03-15 NOTE — ANESTHESIA PROCEDURE NOTES
Airway  Date/Time: 3/15/2021 10:14 AM  Urgency: elective    Airway not difficult    General Information and Staff    Patient location during procedure: OR  Anesthesiologist: Dustin Paz MD  Resident/CRNA: Nicanor Mathis CRNA  Performed: CRNA     Indic

## 2021-03-15 NOTE — ANESTHESIA PREPROCEDURE EVALUATION
PRE-OP EVALUATION    Patient Name: Lenora Nieves    Pre-op Diagnosis: Cholecystitis [K81.9]    Procedure(s):      Surgeon(s) and Role:     * Pedro Baugh MD - Primary    Pre-op vitals reviewed.   Temp: 98.4 °F (36.9 °C)  Pulse: 71  Resp: 16  BP: 126 History    Tobacco Use      Smoking status: Never Smoker      Smokeless tobacco: Never Used    Alcohol use: Yes      Comment: WINE 1 GLASS AT NIGHT      Drug use: No     Available pre-op labs reviewed.   Lab Results   Component Value Date    WBC 7.2 03/08/2

## 2021-03-23 ENCOUNTER — TELEPHONE (OUTPATIENT)
Dept: FAMILY MEDICINE CLINIC | Facility: CLINIC | Age: 76
End: 2021-03-23

## 2021-03-23 DIAGNOSIS — E78.2 MIXED HYPERLIPIDEMIA: ICD-10-CM

## 2021-03-23 DIAGNOSIS — I10 ESSENTIAL HYPERTENSION: ICD-10-CM

## 2021-03-23 DIAGNOSIS — R93.1 ABNORMAL SCREENING CARDIAC CT: ICD-10-CM

## 2021-03-23 RX ORDER — LOSARTAN POTASSIUM 100 MG/1
100 TABLET ORAL DAILY
Qty: 90 TABLET | Refills: 0 | Status: SHIPPED | OUTPATIENT
Start: 2021-03-23 | End: 2021-06-17

## 2021-03-23 RX ORDER — ATORVASTATIN CALCIUM 20 MG/1
20 TABLET, FILM COATED ORAL NIGHTLY
Qty: 90 TABLET | Refills: 0 | Status: SHIPPED | OUTPATIENT
Start: 2021-03-23 | End: 2021-06-17

## 2021-05-04 ENCOUNTER — OFFICE VISIT (OUTPATIENT)
Dept: FAMILY MEDICINE CLINIC | Facility: CLINIC | Age: 76
End: 2021-05-04
Payer: COMMERCIAL

## 2021-05-04 VITALS
HEART RATE: 79 BPM | HEIGHT: 76 IN | RESPIRATION RATE: 16 BRPM | BODY MASS INDEX: 28.86 KG/M2 | TEMPERATURE: 97 F | DIASTOLIC BLOOD PRESSURE: 72 MMHG | OXYGEN SATURATION: 96 % | SYSTOLIC BLOOD PRESSURE: 124 MMHG | WEIGHT: 237 LBS

## 2021-05-04 DIAGNOSIS — R97.20 ELEVATED PSA, BETWEEN 10 AND LESS THAN 20 NG/ML: ICD-10-CM

## 2021-05-04 DIAGNOSIS — E78.2 MIXED HYPERLIPIDEMIA: ICD-10-CM

## 2021-05-04 DIAGNOSIS — I10 ESSENTIAL HYPERTENSION: ICD-10-CM

## 2021-05-04 DIAGNOSIS — Z00.00 ENCOUNTER FOR ANNUAL HEALTH EXAMINATION: Primary | ICD-10-CM

## 2021-05-04 DIAGNOSIS — Z23 NEED FOR VACCINATION: ICD-10-CM

## 2021-05-04 PROCEDURE — 96160 PT-FOCUSED HLTH RISK ASSMT: CPT | Performed by: FAMILY MEDICINE

## 2021-05-04 PROCEDURE — G0009 ADMIN PNEUMOCOCCAL VACCINE: HCPCS | Performed by: FAMILY MEDICINE

## 2021-05-04 PROCEDURE — 90732 PPSV23 VACC 2 YRS+ SUBQ/IM: CPT | Performed by: FAMILY MEDICINE

## 2021-05-04 PROCEDURE — 3008F BODY MASS INDEX DOCD: CPT | Performed by: FAMILY MEDICINE

## 2021-05-04 PROCEDURE — 99397 PER PM REEVAL EST PAT 65+ YR: CPT | Performed by: FAMILY MEDICINE

## 2021-05-04 PROCEDURE — 3074F SYST BP LT 130 MM HG: CPT | Performed by: FAMILY MEDICINE

## 2021-05-04 PROCEDURE — G0439 PPPS, SUBSEQ VISIT: HCPCS | Performed by: FAMILY MEDICINE

## 2021-05-04 PROCEDURE — 3078F DIAST BP <80 MM HG: CPT | Performed by: FAMILY MEDICINE

## 2021-05-04 NOTE — PROGRESS NOTES
HPI:   Rubens Ken is a 77year old male who presents for a MA (Medicare Advantage) [facility] (Once per calendar year). Patient states he has no new complaints. He did undergo a laparoscopic cholecystectomy on 3/15/2021 by Dr. Valeriano Elmore.  He h Essential hypertension     Hyperlipidemia     Right sided sciatica     BPH (benign prostatic hyperplasia)     Screening, anemia, deficiency, iron     Elevated PSA, between 10 and less than 20 ng/ml     Calculus of gallbladder with chronic cholecystitis wit that he does not use drugs.      REVIEW OF SYSTEMS:   GENERAL: feels well otherwise  SKIN: denies any unusual skin lesions  EYES: denies blurred vision or double vision  HEENT: denies nasal congestion, sinus pain or ST  LUNGS: denies shortness of breath wit gums normal   Neck: Supple, symmetrical, trachea midline, no adenopathy, thyroid: not enlarged, symmetric, no tenderness/mass/nodules, no carotid bruit or JVD   Back:   Symmetric, no curvature, ROM normal, no CVA tenderness   Lungs:   Clear to auscultation urology    5. Need for vaccination  - PNEUMOCOCCAL IMM (PNEUMOVAX)    CBC, CMP, lipid panel, PSA and free PSA on 5/5/2021      Diet assessment: good     PLAN:  The patient indicates understanding of these issues and agrees to the plan.   Reinforced healthy Ophthalmology Visit Annually: Diabetics, FHx Glaucoma, AA>50, > 65 No flowsheet data found.     Prostate Cancer Screening      PSA  Annually PSA due on 05/05/2022  Update Health Maintenance if applicable     Immunizations (Update Immunization Activi

## 2021-05-05 ENCOUNTER — LAB ENCOUNTER (OUTPATIENT)
Dept: LAB | Age: 76
End: 2021-05-05
Attending: FAMILY MEDICINE
Payer: MEDICARE

## 2021-05-05 DIAGNOSIS — N40.1 BENIGN PROSTATIC HYPERPLASIA WITH URINARY HESITANCY: ICD-10-CM

## 2021-05-05 DIAGNOSIS — E78.1 PURE HYPERTRIGLYCERIDEMIA: ICD-10-CM

## 2021-05-05 DIAGNOSIS — R39.11 BENIGN PROSTATIC HYPERPLASIA WITH URINARY HESITANCY: ICD-10-CM

## 2021-05-05 DIAGNOSIS — I10 ESSENTIAL HYPERTENSION: ICD-10-CM

## 2021-05-05 PROCEDURE — 84154 ASSAY OF PSA FREE: CPT

## 2021-05-05 PROCEDURE — 80053 COMPREHEN METABOLIC PANEL: CPT

## 2021-05-05 PROCEDURE — 80061 LIPID PANEL: CPT

## 2021-05-05 PROCEDURE — 36415 COLL VENOUS BLD VENIPUNCTURE: CPT

## 2021-05-05 PROCEDURE — 85025 COMPLETE CBC W/AUTO DIFF WBC: CPT

## 2021-05-05 PROCEDURE — 84153 ASSAY OF PSA TOTAL: CPT

## 2021-06-17 DIAGNOSIS — R93.1 ABNORMAL SCREENING CARDIAC CT: ICD-10-CM

## 2021-06-17 DIAGNOSIS — E78.2 MIXED HYPERLIPIDEMIA: ICD-10-CM

## 2021-06-17 DIAGNOSIS — I10 ESSENTIAL HYPERTENSION: ICD-10-CM

## 2021-06-17 RX ORDER — LOSARTAN POTASSIUM 100 MG/1
100 TABLET ORAL DAILY
Qty: 90 TABLET | Refills: 1 | Status: SHIPPED | OUTPATIENT
Start: 2021-06-17 | End: 2021-12-14

## 2021-06-17 RX ORDER — ATORVASTATIN CALCIUM 20 MG/1
20 TABLET, FILM COATED ORAL NIGHTLY
Qty: 90 TABLET | Refills: 1 | Status: SHIPPED | OUTPATIENT
Start: 2021-06-17 | End: 2021-12-14

## 2021-11-10 ENCOUNTER — LAB ENCOUNTER (OUTPATIENT)
Dept: LAB | Age: 76
End: 2021-11-10
Attending: FAMILY MEDICINE
Payer: MEDICARE

## 2021-11-10 DIAGNOSIS — E78.2 MIXED HYPERLIPIDEMIA: ICD-10-CM

## 2021-11-10 PROCEDURE — 36415 COLL VENOUS BLD VENIPUNCTURE: CPT

## 2021-11-10 PROCEDURE — 80061 LIPID PANEL: CPT

## 2021-11-10 PROCEDURE — 80053 COMPREHEN METABOLIC PANEL: CPT

## 2021-12-13 PROCEDURE — 88305 TISSUE EXAM BY PATHOLOGIST: CPT | Performed by: INTERNAL MEDICINE

## 2021-12-14 DIAGNOSIS — I10 ESSENTIAL HYPERTENSION: ICD-10-CM

## 2021-12-14 DIAGNOSIS — R93.1 ABNORMAL SCREENING CARDIAC CT: ICD-10-CM

## 2021-12-14 DIAGNOSIS — E78.2 MIXED HYPERLIPIDEMIA: ICD-10-CM

## 2021-12-14 RX ORDER — ATORVASTATIN CALCIUM 20 MG/1
20 TABLET, FILM COATED ORAL NIGHTLY
Qty: 90 TABLET | Refills: 0 | Status: SHIPPED | OUTPATIENT
Start: 2021-12-14 | End: 2022-03-14

## 2021-12-14 RX ORDER — ATORVASTATIN CALCIUM 20 MG/1
20 TABLET, FILM COATED ORAL NIGHTLY
Qty: 30 TABLET | Refills: 1 | Status: SHIPPED | OUTPATIENT
Start: 2021-12-14 | End: 2021-12-14

## 2021-12-14 RX ORDER — LOSARTAN POTASSIUM 100 MG/1
TABLET ORAL
Qty: 90 TABLET | Refills: 0 | Status: SHIPPED | OUTPATIENT
Start: 2021-12-14 | End: 2022-03-14

## 2021-12-14 RX ORDER — LOSARTAN POTASSIUM 100 MG/1
100 TABLET ORAL DAILY
Qty: 30 TABLET | Refills: 1 | Status: SHIPPED | OUTPATIENT
Start: 2021-12-14 | End: 2021-12-14

## 2022-01-18 ENCOUNTER — OFFICE VISIT (OUTPATIENT)
Dept: FAMILY MEDICINE CLINIC | Facility: CLINIC | Age: 77
End: 2022-01-18
Payer: COMMERCIAL

## 2022-01-18 VITALS
HEART RATE: 72 BPM | BODY MASS INDEX: 30.21 KG/M2 | SYSTOLIC BLOOD PRESSURE: 112 MMHG | RESPIRATION RATE: 16 BRPM | TEMPERATURE: 98 F | WEIGHT: 243 LBS | HEIGHT: 75 IN | DIASTOLIC BLOOD PRESSURE: 64 MMHG

## 2022-01-18 DIAGNOSIS — K76.0 HEPATIC STEATOSIS: ICD-10-CM

## 2022-01-18 DIAGNOSIS — R97.20 ELEVATED PSA, BETWEEN 10 AND LESS THAN 20 NG/ML: ICD-10-CM

## 2022-01-18 DIAGNOSIS — E78.2 MIXED HYPERLIPIDEMIA: ICD-10-CM

## 2022-01-18 DIAGNOSIS — I10 ESSENTIAL HYPERTENSION: Primary | ICD-10-CM

## 2022-01-18 PROCEDURE — 3078F DIAST BP <80 MM HG: CPT | Performed by: FAMILY MEDICINE

## 2022-01-18 PROCEDURE — 3008F BODY MASS INDEX DOCD: CPT | Performed by: FAMILY MEDICINE

## 2022-01-18 PROCEDURE — 3074F SYST BP LT 130 MM HG: CPT | Performed by: FAMILY MEDICINE

## 2022-01-18 PROCEDURE — 99214 OFFICE O/P EST MOD 30 MIN: CPT | Performed by: FAMILY MEDICINE

## 2022-01-18 NOTE — PROGRESS NOTES
HPI:   Caitlyn Zavaleta is a 77year old male who presents for recheck of his mixed hyperlipidemia. Patient reports taking atorvastatin 20 mg daily and 3 fish oil tabs BID. No medication side effects noted. Denies any generalized muscle aches.  Patient p Date Value   11/10/2021 42   05/05/2021 36   03/08/2021 32   04/24/2014 36   09/20/2013 42   10/08/2012 39   02/01/2011 31        Current Outpatient Medications   Medication Sig Dispense Refill   • atorvastatin 20 MG Oral Tab Take 1 tablet (20 mg total) and throat are clear  NECK: supple,no adenopathy,no bruits  LUNGS: clear to auscultation  CARDIO: RRR without murmur     Pulse exam  Right:  pedal 2+, posterior tibial 2. Pulse exam  Left: pedal 2+, posterior tibial 2.   GI: good BS's,no masses, HSM or

## 2022-02-01 ENCOUNTER — OFFICE VISIT (OUTPATIENT)
Dept: FAMILY MEDICINE CLINIC | Facility: CLINIC | Age: 77
End: 2022-02-01
Payer: COMMERCIAL

## 2022-02-01 VITALS
BODY MASS INDEX: 29.84 KG/M2 | SYSTOLIC BLOOD PRESSURE: 128 MMHG | HEART RATE: 68 BPM | HEIGHT: 75 IN | WEIGHT: 240 LBS | RESPIRATION RATE: 16 BRPM | DIASTOLIC BLOOD PRESSURE: 74 MMHG | TEMPERATURE: 97 F

## 2022-02-01 DIAGNOSIS — R10.11 RUQ PAIN: Primary | ICD-10-CM

## 2022-02-01 DIAGNOSIS — Z90.49 HISTORY OF CHOLECYSTECTOMY: ICD-10-CM

## 2022-02-01 LAB
BILIRUB UR QL STRIP.AUTO: NEGATIVE
CLARITY UR REFRACT.AUTO: CLEAR
COLOR UR AUTO: YELLOW
KETONES UR STRIP.AUTO-MCNC: NEGATIVE MG/DL
LEUKOCYTE ESTERASE UR QL STRIP.AUTO: NEGATIVE
NITRITE UR QL STRIP.AUTO: NEGATIVE
PH UR STRIP.AUTO: 5 [PH] (ref 5–8)
PROT UR STRIP.AUTO-MCNC: NEGATIVE MG/DL
RBC UR QL AUTO: NEGATIVE
SP GR UR STRIP.AUTO: 1.01 (ref 1–1.03)
UROBILINOGEN UR STRIP.AUTO-MCNC: <2 MG/DL

## 2022-02-01 PROCEDURE — 99214 OFFICE O/P EST MOD 30 MIN: CPT | Performed by: FAMILY MEDICINE

## 2022-02-01 PROCEDURE — 3078F DIAST BP <80 MM HG: CPT | Performed by: FAMILY MEDICINE

## 2022-02-01 PROCEDURE — 81003 URINALYSIS AUTO W/O SCOPE: CPT | Performed by: FAMILY MEDICINE

## 2022-02-01 PROCEDURE — 3008F BODY MASS INDEX DOCD: CPT | Performed by: FAMILY MEDICINE

## 2022-02-01 PROCEDURE — 3074F SYST BP LT 130 MM HG: CPT | Performed by: FAMILY MEDICINE

## 2022-02-02 ENCOUNTER — HOSPITAL ENCOUNTER (OUTPATIENT)
Dept: ULTRASOUND IMAGING | Age: 77
Discharge: HOME | End: 2022-02-02
Attending: FAMILY MEDICINE
Payer: MEDICARE

## 2022-02-02 DIAGNOSIS — R10.11 RUQ PAIN: ICD-10-CM

## 2022-02-02 PROCEDURE — 76700 US EXAM ABDOM COMPLETE: CPT | Performed by: FAMILY MEDICINE

## 2022-02-18 ENCOUNTER — LAB ENCOUNTER (OUTPATIENT)
Dept: LAB | Age: 77
End: 2022-02-18
Attending: INTERNAL MEDICINE
Payer: MEDICARE

## 2022-02-18 DIAGNOSIS — R10.11 RUQ PAIN: ICD-10-CM

## 2022-02-18 LAB
ALBUMIN SERPL-MCNC: 3.5 G/DL (ref 3.4–5)
ALP LIVER SERPL-CCNC: 59 U/L
ALT SERPL-CCNC: 37 U/L
AST SERPL-CCNC: 18 U/L (ref 15–37)
BILIRUB DIRECT SERPL-MCNC: 0.2 MG/DL (ref 0–0.2)
BILIRUB SERPL-MCNC: 0.7 MG/DL (ref 0.1–2)
PROT SERPL-MCNC: 7 G/DL (ref 6.4–8.2)

## 2022-02-18 PROCEDURE — 36415 COLL VENOUS BLD VENIPUNCTURE: CPT

## 2022-02-18 PROCEDURE — 80076 HEPATIC FUNCTION PANEL: CPT

## 2022-02-19 ENCOUNTER — HOSPITAL ENCOUNTER (OUTPATIENT)
Dept: MRI IMAGING | Age: 77
Discharge: HOME | End: 2022-02-19
Attending: INTERNAL MEDICINE
Payer: MEDICARE

## 2022-02-19 DIAGNOSIS — R10.11 RUQ PAIN: ICD-10-CM

## 2022-02-19 PROCEDURE — 74181 MRI ABDOMEN W/O CONTRAST: CPT | Performed by: INTERNAL MEDICINE

## 2022-02-22 NOTE — PROGRESS NOTES
I reviewed the results with the patient. LFT's are normal. MRI shows fatty liver. This is most likely due to obesity. Biliary tree is normal. Hepatic hemangioma, adrenal adenomas and renal cysts are noted. I recommend 25 pound weight loss over the next 6-12 months. Since his symptoms have resolved and he is not having symptoms, I recommend observation. The patient verbalizes understanding of the above and all questions were answered.

## 2022-03-14 RX ORDER — LOSARTAN POTASSIUM 100 MG/1
TABLET ORAL
Qty: 90 TABLET | Refills: 0 | Status: SHIPPED | OUTPATIENT
Start: 2022-03-14

## 2022-03-14 RX ORDER — ATORVASTATIN CALCIUM 20 MG/1
TABLET, FILM COATED ORAL
Qty: 90 TABLET | Refills: 0 | Status: SHIPPED | OUTPATIENT
Start: 2022-03-14

## 2022-04-28 ENCOUNTER — MED REC SCAN ONLY (OUTPATIENT)
Dept: FAMILY MEDICINE CLINIC | Facility: CLINIC | Age: 77
End: 2022-04-28

## 2022-05-16 ENCOUNTER — LAB ENCOUNTER (OUTPATIENT)
Dept: LAB | Age: 77
End: 2022-05-16
Attending: FAMILY MEDICINE
Payer: MEDICARE

## 2022-05-16 DIAGNOSIS — E78.2 MIXED HYPERLIPIDEMIA: ICD-10-CM

## 2022-05-16 DIAGNOSIS — I10 ESSENTIAL HYPERTENSION: ICD-10-CM

## 2022-05-16 DIAGNOSIS — R97.20 ELEVATED PSA, BETWEEN 10 AND LESS THAN 20 NG/ML: ICD-10-CM

## 2022-05-16 DIAGNOSIS — R73.9 HYPERGLYCEMIA: ICD-10-CM

## 2022-05-16 LAB
ALBUMIN SERPL-MCNC: 3.5 G/DL (ref 3.4–5)
ALBUMIN/GLOB SERPL: 1.1 {RATIO} (ref 1–2)
ALP LIVER SERPL-CCNC: 53 U/L
ALT SERPL-CCNC: 33 U/L
ANION GAP SERPL CALC-SCNC: 4 MMOL/L (ref 0–18)
AST SERPL-CCNC: 19 U/L (ref 15–37)
BASOPHILS # BLD AUTO: 0.05 X10(3) UL (ref 0–0.2)
BASOPHILS NFR BLD AUTO: 0.6 %
BILIRUB SERPL-MCNC: 0.5 MG/DL (ref 0.1–2)
BUN BLD-MCNC: 21 MG/DL (ref 7–18)
CALCIUM BLD-MCNC: 8.8 MG/DL (ref 8.5–10.1)
CHLORIDE SERPL-SCNC: 111 MMOL/L (ref 98–112)
CHOLEST SERPL-MCNC: 94 MG/DL (ref ?–200)
CO2 SERPL-SCNC: 25 MMOL/L (ref 21–32)
CREAT BLD-MCNC: 1.01 MG/DL
EOSINOPHIL # BLD AUTO: 0.3 X10(3) UL (ref 0–0.7)
EOSINOPHIL NFR BLD AUTO: 3.8 %
ERYTHROCYTE [DISTWIDTH] IN BLOOD BY AUTOMATED COUNT: 13 %
EST. AVERAGE GLUCOSE BLD GHB EST-MCNC: 120 MG/DL (ref 68–126)
FASTING PATIENT LIPID ANSWER: YES
FASTING STATUS PATIENT QL REPORTED: YES
GLOBULIN PLAS-MCNC: 3.2 G/DL (ref 2.8–4.4)
GLUCOSE BLD-MCNC: 109 MG/DL (ref 70–99)
HBA1C MFR BLD: 5.8 % (ref ?–5.7)
HCT VFR BLD AUTO: 46.3 %
HDLC SERPL-MCNC: 35 MG/DL (ref 40–59)
HGB BLD-MCNC: 15.2 G/DL
IMM GRANULOCYTES # BLD AUTO: 0.03 X10(3) UL (ref 0–1)
IMM GRANULOCYTES NFR BLD: 0.4 %
LDLC SERPL CALC-MCNC: 37 MG/DL (ref ?–100)
LYMPHOCYTES # BLD AUTO: 2.49 X10(3) UL (ref 1–4)
LYMPHOCYTES NFR BLD AUTO: 31.6 %
MCH RBC QN AUTO: 29.5 PG (ref 26–34)
MCHC RBC AUTO-ENTMCNC: 32.8 G/DL (ref 31–37)
MCV RBC AUTO: 89.7 FL
MONOCYTES # BLD AUTO: 0.75 X10(3) UL (ref 0.1–1)
MONOCYTES NFR BLD AUTO: 9.5 %
NEUTROPHILS # BLD AUTO: 4.26 X10 (3) UL (ref 1.5–7.7)
NEUTROPHILS # BLD AUTO: 4.26 X10(3) UL (ref 1.5–7.7)
NEUTROPHILS NFR BLD AUTO: 54.1 %
NONHDLC SERPL-MCNC: 59 MG/DL (ref ?–130)
OSMOLALITY SERPL CALC.SUM OF ELEC: 294 MOSM/KG (ref 275–295)
PLATELET # BLD AUTO: 282 10(3)UL (ref 150–450)
POTASSIUM SERPL-SCNC: 4.6 MMOL/L (ref 3.5–5.1)
PROT SERPL-MCNC: 6.7 G/DL (ref 6.4–8.2)
PSA FREE MFR SERPL: 26 %
PSA FREE SERPL-MCNC: 4.13 NG/ML
PSA SERPL-MCNC: 15.7 NG/ML (ref ?–4)
RBC # BLD AUTO: 5.16 X10(6)UL
SODIUM SERPL-SCNC: 140 MMOL/L (ref 136–145)
TRIGL SERPL-MCNC: 120 MG/DL (ref 30–149)
VLDLC SERPL CALC-MCNC: 16 MG/DL (ref 0–30)
WBC # BLD AUTO: 7.9 X10(3) UL (ref 4–11)

## 2022-05-16 PROCEDURE — 80061 LIPID PANEL: CPT

## 2022-05-16 PROCEDURE — 80053 COMPREHEN METABOLIC PANEL: CPT

## 2022-05-16 PROCEDURE — 36415 COLL VENOUS BLD VENIPUNCTURE: CPT

## 2022-05-16 PROCEDURE — 84154 ASSAY OF PSA FREE: CPT

## 2022-05-16 PROCEDURE — 85025 COMPLETE CBC W/AUTO DIFF WBC: CPT

## 2022-05-16 PROCEDURE — 84153 ASSAY OF PSA TOTAL: CPT

## 2022-05-16 PROCEDURE — 83036 HEMOGLOBIN GLYCOSYLATED A1C: CPT

## 2022-05-23 ENCOUNTER — OFFICE VISIT (OUTPATIENT)
Dept: FAMILY MEDICINE CLINIC | Facility: CLINIC | Age: 77
End: 2022-05-23
Payer: COMMERCIAL

## 2022-05-23 VITALS
BODY MASS INDEX: 29.34 KG/M2 | WEIGHT: 236 LBS | HEART RATE: 68 BPM | SYSTOLIC BLOOD PRESSURE: 124 MMHG | DIASTOLIC BLOOD PRESSURE: 68 MMHG | RESPIRATION RATE: 16 BRPM | TEMPERATURE: 98 F | HEIGHT: 75 IN

## 2022-05-23 DIAGNOSIS — M54.50 RECURRENT LOW BACK PAIN: ICD-10-CM

## 2022-05-23 DIAGNOSIS — Z00.00 ENCOUNTER FOR ANNUAL HEALTH EXAMINATION: Primary | ICD-10-CM

## 2022-06-12 DIAGNOSIS — I10 ESSENTIAL HYPERTENSION: ICD-10-CM

## 2022-06-12 DIAGNOSIS — R93.1 ABNORMAL SCREENING CARDIAC CT: ICD-10-CM

## 2022-06-12 DIAGNOSIS — E78.2 MIXED HYPERLIPIDEMIA: ICD-10-CM

## 2022-06-13 RX ORDER — ATORVASTATIN CALCIUM 20 MG/1
TABLET, FILM COATED ORAL
Qty: 90 TABLET | Refills: 0 | Status: SHIPPED | OUTPATIENT
Start: 2022-06-13

## 2022-06-13 RX ORDER — LOSARTAN POTASSIUM 100 MG/1
TABLET ORAL
Qty: 90 TABLET | Refills: 0 | Status: SHIPPED | OUTPATIENT
Start: 2022-06-13

## 2022-09-10 DIAGNOSIS — I10 ESSENTIAL HYPERTENSION: ICD-10-CM

## 2022-09-10 DIAGNOSIS — R93.1 ABNORMAL SCREENING CARDIAC CT: ICD-10-CM

## 2022-09-10 DIAGNOSIS — E78.2 MIXED HYPERLIPIDEMIA: ICD-10-CM

## 2022-09-12 RX ORDER — ATORVASTATIN CALCIUM 20 MG/1
TABLET, FILM COATED ORAL
Qty: 90 TABLET | Refills: 0 | Status: SHIPPED | OUTPATIENT
Start: 2022-09-12

## 2022-09-12 RX ORDER — LOSARTAN POTASSIUM 100 MG/1
TABLET ORAL
Qty: 90 TABLET | Refills: 0 | Status: SHIPPED | OUTPATIENT
Start: 2022-09-12

## 2022-12-09 DIAGNOSIS — R93.1 ABNORMAL SCREENING CARDIAC CT: ICD-10-CM

## 2022-12-09 DIAGNOSIS — I10 ESSENTIAL HYPERTENSION: ICD-10-CM

## 2022-12-09 DIAGNOSIS — E78.2 MIXED HYPERLIPIDEMIA: ICD-10-CM

## 2022-12-09 RX ORDER — ATORVASTATIN CALCIUM 20 MG/1
TABLET, FILM COATED ORAL
Qty: 90 TABLET | Refills: 0 | Status: SHIPPED | OUTPATIENT
Start: 2022-12-09

## 2022-12-09 RX ORDER — LOSARTAN POTASSIUM 100 MG/1
TABLET ORAL
Qty: 90 TABLET | Refills: 0 | Status: SHIPPED | OUTPATIENT
Start: 2022-12-09

## 2022-12-29 ENCOUNTER — PATIENT MESSAGE (OUTPATIENT)
Dept: FAMILY MEDICINE CLINIC | Facility: CLINIC | Age: 77
End: 2022-12-29

## 2022-12-29 DIAGNOSIS — E78.2 MIXED HYPERLIPIDEMIA: ICD-10-CM

## 2022-12-29 DIAGNOSIS — R73.9 ELEVATED BLOOD SUGAR: ICD-10-CM

## 2022-12-29 DIAGNOSIS — I10 ESSENTIAL HYPERTENSION: Primary | ICD-10-CM

## 2022-12-30 NOTE — TELEPHONE ENCOUNTER
From: Ramon Mcintosh  To: Judith Gamez MD  Sent: 12/29/2022 3:18 PM CST  Subject: Blood Test    I have a med check appointment scheduled with you on January 19. Is there an order for a blood test that I should have prior to the appointment?   Thank you,  Mike Grajeda

## 2023-01-11 ENCOUNTER — LAB ENCOUNTER (OUTPATIENT)
Dept: LAB | Age: 78
End: 2023-01-11
Attending: FAMILY MEDICINE
Payer: MEDICARE

## 2023-01-11 DIAGNOSIS — I10 ESSENTIAL HYPERTENSION: ICD-10-CM

## 2023-01-11 DIAGNOSIS — R73.9 ELEVATED BLOOD SUGAR: ICD-10-CM

## 2023-01-11 DIAGNOSIS — E78.2 MIXED HYPERLIPIDEMIA: ICD-10-CM

## 2023-01-11 LAB
ALBUMIN SERPL-MCNC: 3.5 G/DL (ref 3.4–5)
ALBUMIN/GLOB SERPL: 1 {RATIO} (ref 1–2)
ALP LIVER SERPL-CCNC: 51 U/L
ALT SERPL-CCNC: 35 U/L
ANION GAP SERPL CALC-SCNC: 2 MMOL/L (ref 0–18)
AST SERPL-CCNC: 19 U/L (ref 15–37)
BASOPHILS # BLD AUTO: 0.04 X10(3) UL (ref 0–0.2)
BASOPHILS NFR BLD AUTO: 0.6 %
BILIRUB SERPL-MCNC: 0.9 MG/DL (ref 0.1–2)
BUN BLD-MCNC: 16 MG/DL (ref 7–18)
CALCIUM BLD-MCNC: 9.4 MG/DL (ref 8.5–10.1)
CHLORIDE SERPL-SCNC: 110 MMOL/L (ref 98–112)
CHOLEST SERPL-MCNC: 100 MG/DL (ref ?–200)
CO2 SERPL-SCNC: 28 MMOL/L (ref 21–32)
CREAT BLD-MCNC: 1.05 MG/DL
EOSINOPHIL # BLD AUTO: 0.31 X10(3) UL (ref 0–0.7)
EOSINOPHIL NFR BLD AUTO: 4.5 %
ERYTHROCYTE [DISTWIDTH] IN BLOOD BY AUTOMATED COUNT: 12.4 %
EST. AVERAGE GLUCOSE BLD GHB EST-MCNC: 117 MG/DL (ref 68–126)
FASTING PATIENT LIPID ANSWER: YES
FASTING STATUS PATIENT QL REPORTED: YES
GFR SERPLBLD BASED ON 1.73 SQ M-ARVRAT: 74 ML/MIN/1.73M2 (ref 60–?)
GLOBULIN PLAS-MCNC: 3.5 G/DL (ref 2.8–4.4)
GLUCOSE BLD-MCNC: 111 MG/DL (ref 70–99)
HBA1C MFR BLD: 5.7 % (ref ?–5.7)
HCT VFR BLD AUTO: 47.3 %
HDLC SERPL-MCNC: 38 MG/DL (ref 40–59)
HGB BLD-MCNC: 15.9 G/DL
IMM GRANULOCYTES # BLD AUTO: 0.02 X10(3) UL (ref 0–1)
IMM GRANULOCYTES NFR BLD: 0.3 %
LDLC SERPL CALC-MCNC: 41 MG/DL (ref ?–100)
LYMPHOCYTES # BLD AUTO: 2.36 X10(3) UL (ref 1–4)
LYMPHOCYTES NFR BLD AUTO: 34 %
MCH RBC QN AUTO: 30.4 PG (ref 26–34)
MCHC RBC AUTO-ENTMCNC: 33.6 G/DL (ref 31–37)
MCV RBC AUTO: 90.4 FL
MONOCYTES # BLD AUTO: 0.67 X10(3) UL (ref 0.1–1)
MONOCYTES NFR BLD AUTO: 9.7 %
NEUTROPHILS # BLD AUTO: 3.54 X10 (3) UL (ref 1.5–7.7)
NEUTROPHILS # BLD AUTO: 3.54 X10(3) UL (ref 1.5–7.7)
NEUTROPHILS NFR BLD AUTO: 50.9 %
NONHDLC SERPL-MCNC: 62 MG/DL (ref ?–130)
OSMOLALITY SERPL CALC.SUM OF ELEC: 292 MOSM/KG (ref 275–295)
PLATELET # BLD AUTO: 264 10(3)UL (ref 150–450)
POTASSIUM SERPL-SCNC: 4.1 MMOL/L (ref 3.5–5.1)
PROT SERPL-MCNC: 7 G/DL (ref 6.4–8.2)
RBC # BLD AUTO: 5.23 X10(6)UL
SODIUM SERPL-SCNC: 140 MMOL/L (ref 136–145)
TRIGL SERPL-MCNC: 112 MG/DL (ref 30–149)
VLDLC SERPL CALC-MCNC: 16 MG/DL (ref 0–30)
WBC # BLD AUTO: 6.9 X10(3) UL (ref 4–11)

## 2023-01-11 PROCEDURE — 80061 LIPID PANEL: CPT

## 2023-01-11 PROCEDURE — 85025 COMPLETE CBC W/AUTO DIFF WBC: CPT

## 2023-01-11 PROCEDURE — 80053 COMPREHEN METABOLIC PANEL: CPT

## 2023-01-11 PROCEDURE — 83036 HEMOGLOBIN GLYCOSYLATED A1C: CPT

## 2023-01-11 PROCEDURE — 36415 COLL VENOUS BLD VENIPUNCTURE: CPT

## 2023-01-19 ENCOUNTER — OFFICE VISIT (OUTPATIENT)
Dept: FAMILY MEDICINE CLINIC | Facility: CLINIC | Age: 78
End: 2023-01-19
Payer: COMMERCIAL

## 2023-01-19 VITALS
HEIGHT: 75 IN | BODY MASS INDEX: 30.21 KG/M2 | RESPIRATION RATE: 16 BRPM | DIASTOLIC BLOOD PRESSURE: 60 MMHG | TEMPERATURE: 98 F | HEART RATE: 72 BPM | SYSTOLIC BLOOD PRESSURE: 118 MMHG | WEIGHT: 243 LBS

## 2023-01-19 DIAGNOSIS — Z00.00 ENCOUNTER FOR ANNUAL HEALTH EXAMINATION: Primary | ICD-10-CM

## 2023-01-19 DIAGNOSIS — E78.2 MIXED HYPERLIPIDEMIA: ICD-10-CM

## 2023-01-19 DIAGNOSIS — R97.20 ELEVATED PSA, BETWEEN 10 AND LESS THAN 20 NG/ML: ICD-10-CM

## 2023-01-19 DIAGNOSIS — I10 ESSENTIAL HYPERTENSION: ICD-10-CM

## 2023-01-19 PROCEDURE — 3078F DIAST BP <80 MM HG: CPT | Performed by: FAMILY MEDICINE

## 2023-01-19 PROCEDURE — 3008F BODY MASS INDEX DOCD: CPT | Performed by: FAMILY MEDICINE

## 2023-01-19 PROCEDURE — 3074F SYST BP LT 130 MM HG: CPT | Performed by: FAMILY MEDICINE

## 2023-01-19 PROCEDURE — 99397 PER PM REEVAL EST PAT 65+ YR: CPT | Performed by: FAMILY MEDICINE

## 2023-01-19 PROCEDURE — 96160 PT-FOCUSED HLTH RISK ASSMT: CPT | Performed by: FAMILY MEDICINE

## 2023-01-19 PROCEDURE — 1126F AMNT PAIN NOTED NONE PRSNT: CPT | Performed by: FAMILY MEDICINE

## 2023-01-19 PROCEDURE — G0439 PPPS, SUBSEQ VISIT: HCPCS | Performed by: FAMILY MEDICINE

## 2023-03-09 DIAGNOSIS — E78.2 MIXED HYPERLIPIDEMIA: ICD-10-CM

## 2023-03-09 DIAGNOSIS — R93.1 ABNORMAL SCREENING CARDIAC CT: ICD-10-CM

## 2023-03-09 DIAGNOSIS — I10 ESSENTIAL HYPERTENSION: ICD-10-CM

## 2023-03-09 RX ORDER — ATORVASTATIN CALCIUM 20 MG/1
TABLET, FILM COATED ORAL
Qty: 90 TABLET | Refills: 1 | Status: SHIPPED | OUTPATIENT
Start: 2023-03-09

## 2023-03-09 RX ORDER — LOSARTAN POTASSIUM 100 MG/1
TABLET ORAL
Qty: 90 TABLET | Refills: 1 | Status: SHIPPED | OUTPATIENT
Start: 2023-03-09

## 2023-06-01 ENCOUNTER — LAB ENCOUNTER (OUTPATIENT)
Dept: LAB | Age: 78
End: 2023-06-01
Attending: FAMILY MEDICINE
Payer: MEDICARE

## 2023-06-01 DIAGNOSIS — E78.2 MIXED HYPERLIPIDEMIA: ICD-10-CM

## 2023-06-01 DIAGNOSIS — I10 ESSENTIAL HYPERTENSION: ICD-10-CM

## 2023-06-01 LAB
ALBUMIN SERPL-MCNC: 3.6 G/DL (ref 3.4–5)
ALBUMIN/GLOB SERPL: 1 {RATIO} (ref 1–2)
ALP LIVER SERPL-CCNC: 58 U/L
ALT SERPL-CCNC: 40 U/L
ANION GAP SERPL CALC-SCNC: 2 MMOL/L (ref 0–18)
AST SERPL-CCNC: 18 U/L (ref 15–37)
BILIRUB SERPL-MCNC: 0.8 MG/DL (ref 0.1–2)
BUN BLD-MCNC: 20 MG/DL (ref 7–18)
CALCIUM BLD-MCNC: 9.4 MG/DL (ref 8.5–10.1)
CHLORIDE SERPL-SCNC: 108 MMOL/L (ref 98–112)
CHOLEST SERPL-MCNC: 102 MG/DL (ref ?–200)
CO2 SERPL-SCNC: 28 MMOL/L (ref 21–32)
CREAT BLD-MCNC: 1.05 MG/DL
FASTING PATIENT LIPID ANSWER: YES
FASTING STATUS PATIENT QL REPORTED: YES
GFR SERPLBLD BASED ON 1.73 SQ M-ARVRAT: 73 ML/MIN/1.73M2 (ref 60–?)
GLOBULIN PLAS-MCNC: 3.5 G/DL (ref 2.8–4.4)
GLUCOSE BLD-MCNC: 114 MG/DL (ref 70–99)
HDLC SERPL-MCNC: 41 MG/DL (ref 40–59)
LDLC SERPL CALC-MCNC: 37 MG/DL (ref ?–100)
NONHDLC SERPL-MCNC: 61 MG/DL (ref ?–130)
OSMOLALITY SERPL CALC.SUM OF ELEC: 289 MOSM/KG (ref 275–295)
POTASSIUM SERPL-SCNC: 4.1 MMOL/L (ref 3.5–5.1)
PROT SERPL-MCNC: 7.1 G/DL (ref 6.4–8.2)
SODIUM SERPL-SCNC: 138 MMOL/L (ref 136–145)
TRIGL SERPL-MCNC: 138 MG/DL (ref 30–149)
VLDLC SERPL CALC-MCNC: 19 MG/DL (ref 0–30)

## 2023-06-01 PROCEDURE — 36415 COLL VENOUS BLD VENIPUNCTURE: CPT

## 2023-06-01 PROCEDURE — 80061 LIPID PANEL: CPT

## 2023-06-01 PROCEDURE — 80053 COMPREHEN METABOLIC PANEL: CPT

## 2023-06-21 ENCOUNTER — LAB ENCOUNTER (OUTPATIENT)
Dept: LAB | Age: 78
End: 2023-06-21
Attending: INTERNAL MEDICINE
Payer: MEDICARE

## 2023-06-21 DIAGNOSIS — R73.9 ELEVATED BLOOD SUGAR: ICD-10-CM

## 2023-06-21 LAB
EST. AVERAGE GLUCOSE BLD GHB EST-MCNC: 126 MG/DL (ref 68–126)
HBA1C MFR BLD: 6 % (ref ?–5.7)

## 2023-06-21 PROCEDURE — 36415 COLL VENOUS BLD VENIPUNCTURE: CPT

## 2023-06-21 PROCEDURE — 83036 HEMOGLOBIN GLYCOSYLATED A1C: CPT

## 2023-07-13 ENCOUNTER — OFFICE VISIT (OUTPATIENT)
Dept: FAMILY MEDICINE CLINIC | Facility: CLINIC | Age: 78
End: 2023-07-13
Payer: COMMERCIAL

## 2023-07-13 VITALS
WEIGHT: 242 LBS | TEMPERATURE: 98 F | RESPIRATION RATE: 16 BRPM | SYSTOLIC BLOOD PRESSURE: 114 MMHG | DIASTOLIC BLOOD PRESSURE: 64 MMHG | HEIGHT: 75 IN | BODY MASS INDEX: 30.09 KG/M2 | HEART RATE: 72 BPM

## 2023-07-13 DIAGNOSIS — I10 ESSENTIAL HYPERTENSION: Primary | ICD-10-CM

## 2023-07-13 DIAGNOSIS — E78.2 MIXED HYPERLIPIDEMIA: ICD-10-CM

## 2023-07-13 DIAGNOSIS — K76.0 HEPATIC STEATOSIS: ICD-10-CM

## 2023-07-13 DIAGNOSIS — R97.20 ELEVATED PSA, BETWEEN 10 AND LESS THAN 20 NG/ML: ICD-10-CM

## 2023-07-13 PROCEDURE — 3074F SYST BP LT 130 MM HG: CPT | Performed by: FAMILY MEDICINE

## 2023-07-13 PROCEDURE — 3078F DIAST BP <80 MM HG: CPT | Performed by: FAMILY MEDICINE

## 2023-07-13 PROCEDURE — 99214 OFFICE O/P EST MOD 30 MIN: CPT | Performed by: FAMILY MEDICINE

## 2023-07-13 PROCEDURE — 1159F MED LIST DOCD IN RCRD: CPT | Performed by: FAMILY MEDICINE

## 2023-07-13 PROCEDURE — 1125F AMNT PAIN NOTED PAIN PRSNT: CPT | Performed by: FAMILY MEDICINE

## 2023-07-13 PROCEDURE — 3008F BODY MASS INDEX DOCD: CPT | Performed by: FAMILY MEDICINE

## 2023-07-13 PROCEDURE — 1170F FXNL STATUS ASSESSED: CPT | Performed by: FAMILY MEDICINE

## 2023-09-22 ENCOUNTER — TELEPHONE (OUTPATIENT)
Dept: FAMILY MEDICINE CLINIC | Facility: CLINIC | Age: 78
End: 2023-09-22

## 2023-09-22 DIAGNOSIS — E78.2 MIXED HYPERLIPIDEMIA: ICD-10-CM

## 2023-09-22 DIAGNOSIS — R93.1 ABNORMAL SCREENING CARDIAC CT: ICD-10-CM

## 2023-09-22 RX ORDER — ATORVASTATIN CALCIUM 20 MG/1
20 TABLET, FILM COATED ORAL NIGHTLY
Qty: 90 TABLET | Refills: 0 | Status: SHIPPED | OUTPATIENT
Start: 2023-09-22

## 2023-09-22 NOTE — TELEPHONE ENCOUNTER
LOV:  07/13/2023 for: Hypertension   Patient advised to RTC on:  See in 6 months   Medication Quantity Refills Start End   ATORVASTATIN 20 MG Oral Tab 90 tablet 1 3/9/2023    Sig:   TAKE 1 TABLET(20 MG) BY MOUTH EVERY NIGHT

## 2023-09-28 DIAGNOSIS — I10 ESSENTIAL HYPERTENSION: ICD-10-CM

## 2023-09-28 RX ORDER — LOSARTAN POTASSIUM 100 MG/1
100 TABLET ORAL DAILY
Qty: 90 TABLET | Refills: 0 | Status: SHIPPED | OUTPATIENT
Start: 2023-09-28

## 2023-09-28 NOTE — TELEPHONE ENCOUNTER
Pt would like refill of LOSARTAN 100 MG Oral Tab sent to [id number] - [address], [address] [place] [address] AT [facility] [address], [phone], [phone] [[id number]] Pharmacy said they have reached out to us several times with no response. Thank you.

## 2023-09-28 NOTE — TELEPHONE ENCOUNTER
LOV: 7/13/23    Last Refill: 3/9/23 #90tablet 1 Refill    Next Appointment: N/A    Advised Pt to establish care with new PCP to avoid delay with med refills but Pt declines to make appointment at this time. Pt is requesting a 90 day supply of his medication. Please see pended order and sign if appropriate.

## 2023-10-12 ENCOUNTER — OFFICE VISIT (OUTPATIENT)
Dept: FAMILY MEDICINE CLINIC | Facility: CLINIC | Age: 78
End: 2023-10-12
Payer: COMMERCIAL

## 2023-10-12 VITALS
SYSTOLIC BLOOD PRESSURE: 120 MMHG | BODY MASS INDEX: 30.44 KG/M2 | TEMPERATURE: 98 F | HEIGHT: 75 IN | RESPIRATION RATE: 16 BRPM | DIASTOLIC BLOOD PRESSURE: 64 MMHG | HEART RATE: 69 BPM | WEIGHT: 244.81 LBS

## 2023-10-12 DIAGNOSIS — E78.2 MIXED HYPERLIPIDEMIA: Primary | ICD-10-CM

## 2023-10-12 DIAGNOSIS — I10 ESSENTIAL HYPERTENSION: ICD-10-CM

## 2023-10-12 DIAGNOSIS — R97.20 ELEVATED PSA, BETWEEN 10 AND LESS THAN 20 NG/ML: ICD-10-CM

## 2023-10-12 PROCEDURE — 3008F BODY MASS INDEX DOCD: CPT | Performed by: FAMILY MEDICINE

## 2023-10-12 PROCEDURE — 3074F SYST BP LT 130 MM HG: CPT | Performed by: FAMILY MEDICINE

## 2023-10-12 PROCEDURE — 1160F RVW MEDS BY RX/DR IN RCRD: CPT | Performed by: FAMILY MEDICINE

## 2023-10-12 PROCEDURE — 99214 OFFICE O/P EST MOD 30 MIN: CPT | Performed by: FAMILY MEDICINE

## 2023-10-12 PROCEDURE — 3078F DIAST BP <80 MM HG: CPT | Performed by: FAMILY MEDICINE

## 2023-10-12 PROCEDURE — 1159F MED LIST DOCD IN RCRD: CPT | Performed by: FAMILY MEDICINE

## 2023-12-21 DIAGNOSIS — I10 ESSENTIAL HYPERTENSION: ICD-10-CM

## 2023-12-21 DIAGNOSIS — E78.2 MIXED HYPERLIPIDEMIA: ICD-10-CM

## 2023-12-21 DIAGNOSIS — R93.1 ABNORMAL SCREENING CARDIAC CT: ICD-10-CM

## 2023-12-21 RX ORDER — LOSARTAN POTASSIUM 100 MG/1
100 TABLET ORAL DAILY
Qty: 90 TABLET | Refills: 0 | Status: SHIPPED | OUTPATIENT
Start: 2023-12-21

## 2023-12-21 RX ORDER — ATORVASTATIN CALCIUM 20 MG/1
20 TABLET, FILM COATED ORAL NIGHTLY
Qty: 90 TABLET | Refills: 0 | Status: SHIPPED | OUTPATIENT
Start: 2023-12-21

## 2024-01-05 ENCOUNTER — HOSPITAL ENCOUNTER (EMERGENCY)
Age: 79
Discharge: HOME | End: 2024-01-05
Payer: MEDICARE

## 2024-01-05 VITALS
RESPIRATION RATE: 16 BRPM | TEMPERATURE: 98 F | DIASTOLIC BLOOD PRESSURE: 72 MMHG | HEIGHT: 75 IN | BODY MASS INDEX: 29.22 KG/M2 | WEIGHT: 235 LBS | HEART RATE: 67 BPM | OXYGEN SATURATION: 99 % | SYSTOLIC BLOOD PRESSURE: 138 MMHG

## 2024-01-05 DIAGNOSIS — S05.02XA ABRASION OF LEFT CORNEA, INITIAL ENCOUNTER: Primary | ICD-10-CM

## 2024-01-05 PROCEDURE — 99283 EMERGENCY DEPT VISIT LOW MDM: CPT

## 2024-01-05 RX ORDER — TETRACAINE HYDROCHLORIDE 5 MG/ML
1 SOLUTION OPHTHALMIC ONCE
Status: COMPLETED | OUTPATIENT
Start: 2024-01-05 | End: 2024-01-05

## 2024-01-05 RX ORDER — OFLOXACIN 3 MG/ML
2 SOLUTION/ DROPS OPHTHALMIC EVERY 4 HOURS
Qty: 10 ML | Refills: 0 | Status: SHIPPED | OUTPATIENT
Start: 2024-01-05

## 2024-01-06 NOTE — ED PROVIDER NOTES
Patient Seen in: [facility] [place]      History     Chief Complaint   Patient presents with    Eye Visual Problem     Stated Complaint: eye injury this morning    Subjective:   HPI    Pleasant 78-year-old gentleman.  Patient was poked in the eye by a [place] branch while throwing away his [place] tree earlier today.  Has a mild persistent foreign body sensation to the left eye.  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    High blood pressure     High cholesterol     Hx of oral surgery     Visual impairment     contacts              Past Surgical History:   Procedure Laterality Date    CHOLECYSTECTOMY  03/2021    COLONOSCOPY  09/26/2018    WISDOM TEETH REMOVED      asa a teen ager                Social History     Socioeconomic History    Marital status:    Tobacco Use    Smoking status: Never    Smokeless tobacco: Never    Tobacco comments:     53 years ago   Vaping Use    Vaping Use: Never used   Substance and Sexual Activity    Alcohol use: Yes     Comment: WINE 1 GLASS AT NIGHT/socially     Drug use: No   Other Topics Concern    Caffeine Concern Yes     Comment: COFFEE 3-4 DAILY    Exercise Yes     Comment: 1-2 x a week     Seat Belt Yes              Review of Systems    Positive for stated complaint: eye injury this morning  Other systems are as noted in HPI.  Constitutional and vital signs reviewed.      All other systems reviewed and negative except as noted above.    Physical Exam     ED Triage Vitals   BP 01/05/24 1540 133/77   Pulse 01/05/24 1540 62   Resp 01/05/24 1540 16   Temp 01/05/24 1540 97.5 °F (36.4 °C)   Temp src 01/05/24 1540 Temporal   SpO2 01/05/24 1540 96 %   O2 Device 01/05/24 1750 None (Room air)       Current:/72   Pulse 67   Temp 97.5 °F (36.4 °C) (Temporal)   Resp 16   Ht 190.5 cm (6' 3\")   Wt 106.6 kg   SpO2 99%    BMI 29.37 kg/m²     Right Eye Chart Acuity: 20/40, Uncorrected  Left Eye Chart Acuity: 20/50, Uncorrected    Physical Exam    Gen: Well appearing, well groomed, alert and aware x 3  Lung: No distress, RR, no retraction  Extremities: Full ROM, no deformity, NVI  Eye examination: No drainage or purulent discharge.  No photosensitivity.  Suggestion of central defect.  Fluorescein staining demonstrates uptake at this location.  Roughly 4 x 4 millimeters.  No obvious retained foreign body.  Neuro:  Normal Gait    ED Course   Labs Reviewed - No data to display                   MDM          2 drops of tetracaine applied to the left eye.  Fluorescein staining performed.  Central uptake.  Fairly large surface area roughly 4 x 4 mm.  No pooling or rivulets.  Minimal pain.  No photosensitivity    Patient placed on Ocuflox every 4.  Ophthalmology for recheck in 48 hours.                               Medical Decision Making      Disposition and Plan     Clinical Impression:  1. Abrasion of left cornea, initial encounter         Disposition:  Discharge  1/5/2024  6:08 pm    Follow-up:  Tucker Rand MD  [address] [place] [address]  [address]  [facility] [id number] [phone]    Follow up      Zari Kimble DO  [address] [facility]  [address]  [facility] [address]  [phone]    Follow up            Medications Prescribed:  Current Discharge Medication List        START taking these medications    Details   ofloxacin 0.3 % Ophthalmic Solution Place 2 drops into the left eye every 4 (four) hours.  Qty: 10 mL, Refills: 0

## 2024-01-06 NOTE — DISCHARGE INSTRUCTIONS
Recommend follow-up with ophthalmology in 2 to 3 days for recheck.  Administer antibiotic eyedrop every 3-4 hours.

## 2024-01-09 ENCOUNTER — PATIENT OUTREACH (OUTPATIENT)
Dept: CASE MANAGEMENT | Age: 79
End: 2024-01-09

## 2024-01-09 NOTE — PROGRESS NOTES
1st attempt ER f/up apt request  No answer, LVMTCB to schedule apt  PCP -unable to contact  OPTHALM -unable to contact  Closing encounter

## 2024-01-19 ENCOUNTER — LAB ENCOUNTER (OUTPATIENT)
Dept: LAB | Age: 79
End: 2024-01-19
Attending: FAMILY MEDICINE
Payer: MEDICARE

## 2024-01-19 DIAGNOSIS — E78.2 MIXED HYPERLIPIDEMIA: ICD-10-CM

## 2024-01-19 LAB
ALBUMIN SERPL-MCNC: 3.7 G/DL (ref 3.4–5)
ALBUMIN/GLOB SERPL: 1.1 {RATIO} (ref 1–2)
ALP LIVER SERPL-CCNC: 62 U/L
ALT SERPL-CCNC: 34 U/L
ANION GAP SERPL CALC-SCNC: 5 MMOL/L (ref 0–18)
AST SERPL-CCNC: 23 U/L (ref 15–37)
BASOPHILS # BLD AUTO: 0.05 X10(3) UL (ref 0–0.2)
BASOPHILS NFR BLD AUTO: 0.7 %
BILIRUB SERPL-MCNC: 0.7 MG/DL (ref 0.1–2)
BUN BLD-MCNC: 21 MG/DL (ref 9–23)
CALCIUM BLD-MCNC: 9.4 MG/DL (ref 8.5–10.1)
CHLORIDE SERPL-SCNC: 109 MMOL/L (ref 98–112)
CHOLEST SERPL-MCNC: 105 MG/DL (ref ?–200)
CO2 SERPL-SCNC: 28 MMOL/L (ref 21–32)
CREAT BLD-MCNC: 1.25 MG/DL
EGFRCR SERPLBLD CKD-EPI 2021: 59 ML/MIN/1.73M2 (ref 60–?)
EOSINOPHIL # BLD AUTO: 0.28 X10(3) UL (ref 0–0.7)
EOSINOPHIL NFR BLD AUTO: 3.9 %
ERYTHROCYTE [DISTWIDTH] IN BLOOD BY AUTOMATED COUNT: 12.5 %
FASTING PATIENT LIPID ANSWER: YES
FASTING STATUS PATIENT QL REPORTED: YES
GLOBULIN PLAS-MCNC: 3.5 G/DL (ref 2.8–4.4)
GLUCOSE BLD-MCNC: 115 MG/DL (ref 70–99)
HCT VFR BLD AUTO: 48.4 %
HDLC SERPL-MCNC: 38 MG/DL (ref 40–59)
HGB BLD-MCNC: 16.5 G/DL
IMM GRANULOCYTES # BLD AUTO: 0.02 X10(3) UL (ref 0–1)
IMM GRANULOCYTES NFR BLD: 0.3 %
LDLC SERPL CALC-MCNC: 43 MG/DL (ref ?–100)
LYMPHOCYTES # BLD AUTO: 2.43 X10(3) UL (ref 1–4)
LYMPHOCYTES NFR BLD AUTO: 34 %
MCH RBC QN AUTO: 29.5 PG (ref 26–34)
MCHC RBC AUTO-ENTMCNC: 34.1 G/DL (ref 31–37)
MCV RBC AUTO: 86.6 FL
MONOCYTES # BLD AUTO: 0.62 X10(3) UL (ref 0.1–1)
MONOCYTES NFR BLD AUTO: 8.7 %
NEUTROPHILS # BLD AUTO: 3.74 X10 (3) UL (ref 1.5–7.7)
NEUTROPHILS # BLD AUTO: 3.74 X10(3) UL (ref 1.5–7.7)
NEUTROPHILS NFR BLD AUTO: 52.4 %
NONHDLC SERPL-MCNC: 67 MG/DL (ref ?–130)
OSMOLALITY SERPL CALC.SUM OF ELEC: 298 MOSM/KG (ref 275–295)
PLATELET # BLD AUTO: 294 10(3)UL (ref 150–450)
POTASSIUM SERPL-SCNC: 4.7 MMOL/L (ref 3.5–5.1)
PROT SERPL-MCNC: 7.2 G/DL (ref 6.4–8.2)
RBC # BLD AUTO: 5.59 X10(6)UL
SODIUM SERPL-SCNC: 142 MMOL/L (ref 136–145)
T4 FREE SERPL-MCNC: 1 NG/DL (ref 0.8–1.7)
TRIGL SERPL-MCNC: 135 MG/DL (ref 30–149)
TSI SER-ACNC: 2.91 MIU/ML (ref 0.36–3.74)
VLDLC SERPL CALC-MCNC: 19 MG/DL (ref 0–30)
WBC # BLD AUTO: 7.1 X10(3) UL (ref 4–11)

## 2024-01-19 PROCEDURE — 80053 COMPREHEN METABOLIC PANEL: CPT

## 2024-01-19 PROCEDURE — 84443 ASSAY THYROID STIM HORMONE: CPT

## 2024-01-19 PROCEDURE — 36415 COLL VENOUS BLD VENIPUNCTURE: CPT

## 2024-01-19 PROCEDURE — 80061 LIPID PANEL: CPT

## 2024-01-19 PROCEDURE — 84439 ASSAY OF FREE THYROXINE: CPT

## 2024-01-19 PROCEDURE — 85025 COMPLETE CBC W/AUTO DIFF WBC: CPT

## 2024-01-25 ENCOUNTER — OFFICE VISIT (OUTPATIENT)
Dept: FAMILY MEDICINE CLINIC | Facility: CLINIC | Age: 79
End: 2024-01-25
Payer: COMMERCIAL

## 2024-01-25 ENCOUNTER — MED REC SCAN ONLY (OUTPATIENT)
Dept: FAMILY MEDICINE CLINIC | Facility: CLINIC | Age: 79
End: 2024-01-25

## 2024-01-25 VITALS
SYSTOLIC BLOOD PRESSURE: 102 MMHG | BODY MASS INDEX: 29.59 KG/M2 | HEART RATE: 78 BPM | HEIGHT: 75 IN | TEMPERATURE: 98 F | RESPIRATION RATE: 18 BRPM | DIASTOLIC BLOOD PRESSURE: 60 MMHG | WEIGHT: 238 LBS

## 2024-01-25 DIAGNOSIS — R39.11 BENIGN PROSTATIC HYPERPLASIA WITH URINARY HESITANCY: ICD-10-CM

## 2024-01-25 DIAGNOSIS — I77.819 AORTIC ECTASIA (HCC): ICD-10-CM

## 2024-01-25 DIAGNOSIS — N40.1 BENIGN PROSTATIC HYPERPLASIA WITH URINARY HESITANCY: ICD-10-CM

## 2024-01-25 DIAGNOSIS — N18.31 STAGE 3A CHRONIC KIDNEY DISEASE (HCC): Chronic | ICD-10-CM

## 2024-01-25 DIAGNOSIS — Z00.00 MEDICARE ANNUAL WELLNESS VISIT, SUBSEQUENT: ICD-10-CM

## 2024-01-25 DIAGNOSIS — I10 ESSENTIAL HYPERTENSION: ICD-10-CM

## 2024-01-25 PROBLEM — N18.30 CKD (CHRONIC KIDNEY DISEASE) STAGE 3, GFR 30-59 ML/MIN (HCC): Chronic | Status: ACTIVE | Noted: 2024-01-25

## 2024-01-25 PROCEDURE — 1170F FXNL STATUS ASSESSED: CPT | Performed by: FAMILY MEDICINE

## 2024-01-25 PROCEDURE — 3074F SYST BP LT 130 MM HG: CPT | Performed by: FAMILY MEDICINE

## 2024-01-25 PROCEDURE — 3008F BODY MASS INDEX DOCD: CPT | Performed by: FAMILY MEDICINE

## 2024-01-25 PROCEDURE — 3078F DIAST BP <80 MM HG: CPT | Performed by: FAMILY MEDICINE

## 2024-01-25 PROCEDURE — G0439 PPPS, SUBSEQ VISIT: HCPCS | Performed by: FAMILY MEDICINE

## 2024-01-25 PROCEDURE — 1159F MED LIST DOCD IN RCRD: CPT | Performed by: FAMILY MEDICINE

## 2024-01-25 PROCEDURE — 96160 PT-FOCUSED HLTH RISK ASSMT: CPT | Performed by: FAMILY MEDICINE

## 2024-01-25 PROCEDURE — 1160F RVW MEDS BY RX/DR IN RCRD: CPT | Performed by: FAMILY MEDICINE

## 2024-01-25 PROCEDURE — 1125F AMNT PAIN NOTED PAIN PRSNT: CPT | Performed by: FAMILY MEDICINE

## 2024-01-25 NOTE — PROGRESS NOTES
Subjective:   Redd Daniels is a 78 year old male who presents for a Medicare Subsequent Annual Wellness visit (Pt already had Initial Annual Wellness) and scheduled follow up of multiple significant but stable problems.   Patient states that he has been doing well with his medications.  He has not had any issues with them.  He does have a history of chronic kidney disease hypertension BPH.  The patient has been following up with urology for the BPH.  His last PSAs have been in the range of 15's but has had biopsies and cancer findings.  He states that he is concerned in regards to an aortic aneurysm he does have a strong family history of this as well as a smoking history with himself.  Denies any abdominal pain headaches.    History/Other:   Fall Risk Assessment:   He has been screened for Falls and is low risk.      Cognitive Assessment:   He had a completely normal cognitive assessment - see flowsheet entries     Functional Ability/Status:   Redd Daniels has some abnormal functions as listed below:  He has Hearing problems based on screening of functional status.      Depression Screening (PHQ-2/PHQ-9):          Advanced Directives:   He does have a Living Will but we do NOT have it on file in Epic.    He does have a POA but we do NOT have it on file in Epic.    Discussed Advance Care Planning with patient (and family/surrogate if present). Standard forms made available to patient in After Visit Summary.      Patient Active Problem List   Diagnosis    Other abnormal blood chemistry    Other symptoms involving cardiovascular system    Microscopic hematuria    Essential hypertension    Right sided sciatica    BPH (benign prostatic hyperplasia)    Screening, anemia, deficiency, iron    Elevated PSA, between 10 and less than 20 ng/ml    Calculus of gallbladder with chronic cholecystitis without obstruction    Preprocedural examination    CKD (chronic kidney disease) stage 3, GFR 30-59 ml/min  ([facility])     Allergies:  He has No Known Allergies.    Current Medications:  Outpatient Medications Marked as Taking for the 1/25/24 encounter (Office Visit) with Tucker Rand MD   Medication Sig    ATORVASTATIN 20 MG Oral Tab TAKE 1 TABLET(20 MG) BY MOUTH EVERY NIGHT    LOSARTAN 100 MG Oral Tab TAKE 1 TABLET(100 MG) BY MOUTH DAILY    Omega-3 Fatty Acids (FISH OIL OR) Take 1,200 mg by mouth once daily. 6 daily    Aspirin 81 MG Oral Tab Take 1 tablet (81 mg total) by mouth daily.    Multiple Vitamin (MULTIVITAMINS OR) Take  by mouth.       Medical History:  He  has a past medical history of Calculus of kidney, Hearing impairment, High blood pressure, High cholesterol, oral surgery, and Visual impairment.  Surgical History:  He  has a past surgical history that includes wisdom teeth removed; colonoscopy (09/26/2018); and cholecystectomy (03/2021).   Family History:  His family history includes Cancer in his mother; Hypertension in his mother; Parkinson's Disease in his father.  Social History:  He  reports that he has never smoked. He has never used smokeless tobacco. He reports current alcohol use. He reports that he does not use drugs.    Tobacco:  He has never smoked tobacco.    CAGE Alcohol Screen:   CAGE screening score of 0 on 1/22/2024, showing low risk of alcohol abuse.      Patient Care Team:  Tucker Rand MD as PCP - General  Balwinder Pryor MD (GASTROENTEROLOGY)    Review of Systems  Consitutional: No fevers, chills, sweats  Eye: No recent visual problems  ENMT: No ear pain nasal congestion sore throat  Respiratory: No shortness of breath, cough  Cardiovascular: No chest pain palpitations syncope  Gastrointestinal: No nausea vomiting diarrhea  Genitourinary: No hematuria  Hema/Lymph no bruising tendency, swollen lymph glands  Endocrine: Negative for excessive thirst excessive hunger  Musculoskeletal: No back pain neck pain joint pain muscle pain decreased range of motion  Integumentary: No rash,  pruritus, abrasions  Neurologic: Alert and oriented x4  Psychiatric: No anxiety, depression    Medical, surgical, family, and social histories were reviewed      Objective:   Physical Exam      VITALS: Vitals reviewed   GENERAL: well developed, well nourished, in no apparent distress  SKIN: no rashes, no suspicious lesions: Cool and Dry  HEENT: atraumatic, normocephalic, ears and throat are clear bilateral tympanic membranes lucent nonbulging intact nose without bleeding purulent discharge or septal hematoma.    EYES: Pupils equal round and reactive.  Extraocular motions intact no scleral icterus no injection or drainage  THROAT without erythema tonsillar hypertrophy or exudate.  Uvula midline airway patent                Hearing Assessed via: Whispered Voice  NECK: trachea midline.  No JVD or lymphadenopathy supple nontender no meningeal signs   LUNGS: clear to auscultation sounds equal bilaterally no wheezes rales or rhonchi  CARDIO: Regular rate and rhythm without murmurs gallops or rubs  GI: Soft nontender nondistended no hepatomegaly palpable masses.  No guarding.   without deformity or crepitus no flank tenderness  EXTREMITIES: no cyanosis, clubbing or edema no joint tenderness effusion or edema noted.  No calf tenderness negative Homans' sign bilaterally  NEURO: Awake and alert.  Cranial nerves II through XII intact motor and sensory grossly within normal limits.  5 out of 5 muscle strength in all muscle groups.  Normal speech.  PSYCHIATRIC: Awake, alert and oriented x3, cooperative appropriate mood and affect.  Judgment intact    /60 (BP Location: Left arm, Patient Position: Sitting, Cuff Size: large)   Pulse 78   Temp 97.5 °F (36.4 °C) (Temporal)   Resp 18   Ht 6' 3\" (1.905 m)   Wt 238 lb (108 kg)   BMI 29.75 kg/m²  Estimated body mass index is 29.75 kg/m² as calculated from the following:    Height as of this encounter: 6' 3\" (1.905 m).    Weight as of this encounter: 238 lb (108  kg).    Medicare Hearing Assessment:   Hearing Screening    Time taken: 1/25/2024 10:02 AM  Entry User: Yvonne Russ MA  Screening Method: Finger Rub  Finger Rub Result: Fail               Visual Acuity:   Right Eye Visual Acuity: Corrected Right Eye Chart Acuity: 20/40   Left Eye Visual Acuity: Corrected Left Eye Chart Acuity: 20/20   Both Eyes Visual Acuity: Corrected Both Eyes Chart Acuity: 20/20   Able To Tolerate Visual Acuity: Yes        Assessment & Plan:   Redd Daniels is a 78 year old male who presents for a Medicare Assessment.     1. Medicare annual wellness visit, subsequent  Patient has been doing well at this time has been taking his medications as prescribed he denies any acute concerns today he was asked to follow-up yearly for regular physical exams or for any other acute concerns.      2. Aortic ectasia (HCC)  -     US ABDOMINAL AORTIC ANEURYSM SCREENING (CPT=76706); Future; Expected date: 01/25/2024    I did discuss with the patient we could go ahead and proceed with an ultrasound of the abdomen to discern any changes in the previous aortic ultrasound that was done.  He denies any abdominal pain chest pain at this time        3. Stage 3a chronic kidney disease (HCC)  CMP was completed earlier this week was within normal limits at this time we will continue to monitor his GFR as well as creatinine        4. Essential hypertension    Blood pressure remains well-controlled at this time he was asked to continue to take his medications.  Has not had any headaches chest pain shortness of breath.        5. Benign prostatic hyperplasia with urinary hesitancy  Patient was asked to continue to follow-up with urology for further recommendations.  They have discontinued PSAs with him.    The patient presented today for Medicare annual wellness visit.  During the course of today's visit the health risk assessment past medical family and social histories were updated and reviewed with the  patient.  The patient was educated and counseled about appropriate screening and preventative services and these were ordered as appropriate.  Referrals for educational and counseling services were made where indicated.  Advance directives were discussed.  A copy of the recommended preventative screenings as well as discharge instructions were provided to the patient.  End-of-life planning was discussed advanced directives were also discussed and are in place.      The patient indicates understanding of these issues and agrees to the plan.  Imaging studies ordered.  Lab work ordered.  Patient reassured.  Reinforced healthy diet, lifestyle, and exercise.      No follow-ups on file.     Tucker Rand MD, 1/25/2024     Supplementary Documentation:   General Health:           Redd Daniels's SCREENING SCHEDULE   Tests on this list are recommended by your physician but may not be covered, or covered at this frequency, by your insurer.   Please check with your insurance carrier before scheduling to verify coverage.   PREVENTATIVE SERVICES FREQUENCY &  COVERAGE DETAILS LAST COMPLETION DATE   Diabetes Screening    Fasting Blood Sugar / Glucose    One screening every 12 months if never tested or if previously tested but not diagnosed with pre-diabetes   One screening every 6 months if diagnosed with pre-diabetes Lab Results   Component Value Date     (H) 01/19/2024        Cardiovascular Disease Screening    Lipid Panel  Cholesterol  Lipoprotein (HDL)  Triglycerides Covered every 5 years for all Medicare beneficiaries without apparent signs or symptoms of cardiovascular disease Lab Results   Component Value Date    CHOLEST 105 01/19/2024    HDL 38 (L) 01/19/2024    LDL 43 01/19/2024    TRIG 135 01/19/2024         Electrocardiogram (EKG)   Covered if needed at Welcome to Medicare, and non-screening if indicated for medical reasons 03/12/2021      Ultrasound Screening for Abdominal Aortic Aneurysm (AAA)  Covered once in a lifetime for one of the following risk factors    Men who are 65-75 years old and have ever smoked    Anyone with a family history -     Colorectal Cancer Screening  Covered for ages 50-85; only need ONE of the following:    Colonoscopy   Covered every 10 years    Covered every 2 years if patient is at high risk or previous colonoscopy was abnormal 12/13/2021    Health Maintenance   Topic Date Due    Colorectal Cancer Screening  12/13/2026       Flexible Sigmoidoscopy   Covered every 4 years -    Fecal Occult Blood Test Covered annually -   Prostate Cancer Screening    Prostate-Specific Antigen (PSA) Annually Lab Results   Component Value Date    PSA 15.70 (H) 05/16/2022     Health Maintenance   Topic Date Due    PSA  05/16/2023      Immunizations    Influenza Covered once per flu season  Please get every year 08/24/2023  No recommendations at this time    Pneumococcal Each vaccine (Spqyebb12 & Sqtvaexxt50) covered once after 65 Prevnar 13: 02/19/2019    Unxpavozs55: 05/04/2021     No recommendations at this time    Hepatitis B One screening covered for patients with certain risk factors   10/27/1997  No recommendations at this time    Tetanus Toxoid Not covered by Medicare Part B unless medically necessary (cut with metal); may be covered with your pharmacy prescription benefits -    Tetanus, Diptheria and Pertusis TD and TDaP Not covered by Medicare Part B -  No recommendations at this time    Zoster Not covered by Medicare Part B; may be covered with your pharmacy  prescription benefits -  No recommendations at this time     Annual Monitoring of Persistent Medications (ACE/ARB, digoxin diuretics, anticonvulsants)    Potassium Annually Lab Results   Component Value Date    K 4.7 01/19/2024         Creatinine   Annually Lab Results   Component Value Date    CREATSERUM 1.25 01/19/2024         BUN Annually Lab Results   Component Value Date    BUN 21 01/19/2024       Drug Serum Conc Annually No  results found for: \"DIGOXIN\", \"DIG\", \"VALP\"

## 2024-02-22 ENCOUNTER — HOSPITAL ENCOUNTER (OUTPATIENT)
Dept: ULTRASOUND IMAGING | Age: 79
Discharge: HOME | End: 2024-02-22
Attending: FAMILY MEDICINE
Payer: MEDICARE

## 2024-02-22 DIAGNOSIS — I77.819 AORTIC ECTASIA (HCC): ICD-10-CM

## 2024-02-22 PROCEDURE — 76706 US ABDL AORTA SCREEN AAA: CPT | Performed by: FAMILY MEDICINE

## 2024-03-20 DIAGNOSIS — I10 ESSENTIAL HYPERTENSION: ICD-10-CM

## 2024-03-20 DIAGNOSIS — E78.2 MIXED HYPERLIPIDEMIA: ICD-10-CM

## 2024-03-20 DIAGNOSIS — R93.1 ABNORMAL SCREENING CARDIAC CT: ICD-10-CM

## 2024-03-20 RX ORDER — LOSARTAN POTASSIUM 100 MG/1
100 TABLET ORAL DAILY
Qty: 90 TABLET | Refills: 0 | Status: SHIPPED | OUTPATIENT
Start: 2024-03-20

## 2024-03-20 RX ORDER — ATORVASTATIN CALCIUM 20 MG/1
20 TABLET, FILM COATED ORAL NIGHTLY
Qty: 90 TABLET | Refills: 0 | Status: SHIPPED | OUTPATIENT
Start: 2024-03-20

## 2024-06-18 DIAGNOSIS — I10 ESSENTIAL HYPERTENSION: ICD-10-CM

## 2024-06-18 DIAGNOSIS — E78.2 MIXED HYPERLIPIDEMIA: ICD-10-CM

## 2024-06-18 DIAGNOSIS — R93.1 ABNORMAL SCREENING CARDIAC CT: ICD-10-CM

## 2024-06-18 RX ORDER — LOSARTAN POTASSIUM 100 MG/1
100 TABLET ORAL DAILY
Qty: 90 TABLET | Refills: 0 | Status: SHIPPED | OUTPATIENT
Start: 2024-06-18

## 2024-06-18 RX ORDER — ATORVASTATIN CALCIUM 20 MG/1
20 TABLET, FILM COATED ORAL NIGHTLY
Qty: 90 TABLET | Refills: 0 | Status: SHIPPED | OUTPATIENT
Start: 2024-06-18

## 2024-07-15 ENCOUNTER — OFFICE VISIT (OUTPATIENT)
Dept: FAMILY MEDICINE CLINIC | Facility: CLINIC | Age: 79
End: 2024-07-15
Payer: COMMERCIAL

## 2024-07-15 VITALS
DIASTOLIC BLOOD PRESSURE: 58 MMHG | BODY MASS INDEX: 29.47 KG/M2 | SYSTOLIC BLOOD PRESSURE: 108 MMHG | RESPIRATION RATE: 16 BRPM | WEIGHT: 237 LBS | HEIGHT: 75 IN | HEART RATE: 62 BPM | TEMPERATURE: 97 F

## 2024-07-15 DIAGNOSIS — N40.1 BENIGN PROSTATIC HYPERPLASIA WITH URINARY HESITANCY: ICD-10-CM

## 2024-07-15 DIAGNOSIS — I10 ESSENTIAL HYPERTENSION: ICD-10-CM

## 2024-07-15 DIAGNOSIS — E78.2 MIXED HYPERLIPIDEMIA: Primary | ICD-10-CM

## 2024-07-15 DIAGNOSIS — R39.11 BENIGN PROSTATIC HYPERPLASIA WITH URINARY HESITANCY: ICD-10-CM

## 2024-07-15 PROCEDURE — 1170F FXNL STATUS ASSESSED: CPT | Performed by: FAMILY MEDICINE

## 2024-07-15 PROCEDURE — 1160F RVW MEDS BY RX/DR IN RCRD: CPT | Performed by: FAMILY MEDICINE

## 2024-07-15 PROCEDURE — 3008F BODY MASS INDEX DOCD: CPT | Performed by: FAMILY MEDICINE

## 2024-07-15 PROCEDURE — G2211 COMPLEX E/M VISIT ADD ON: HCPCS | Performed by: FAMILY MEDICINE

## 2024-07-15 PROCEDURE — 1159F MED LIST DOCD IN RCRD: CPT | Performed by: FAMILY MEDICINE

## 2024-07-15 PROCEDURE — 3074F SYST BP LT 130 MM HG: CPT | Performed by: FAMILY MEDICINE

## 2024-07-15 PROCEDURE — 3078F DIAST BP <80 MM HG: CPT | Performed by: FAMILY MEDICINE

## 2024-07-15 PROCEDURE — 99213 OFFICE O/P EST LOW 20 MIN: CPT | Performed by: FAMILY MEDICINE

## 2024-07-15 NOTE — PROGRESS NOTES
[facility] Family Medicine Office Note  Chief Complaint:   Chief Complaint   Patient presents with    Medication Follow-Up    Hyperlipidemia    Hypertension       HPI:   This is a 79 year old male coming in for medication check.  The patient states that he has been doing well states that he has been taking his medications without issue.  Denies any chest pain headache shortness of breath.  He does have a history of hypertension hyperlipidemia chronic kidney disease BPH.      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    High blood pressure    High cholesterol    Hx of oral surgery    Visual impairment    contacts     Past Surgical History:   Procedure Laterality Date    Cholecystectomy  03/2021    Colonoscopy  09/26/2018    [place] teeth removed      asa a teen ager     Social History:  Social History     Socioeconomic History    Marital status:    Tobacco Use    Smoking status: Never    Smokeless tobacco: Never    Tobacco comments:     53 years ago   Vaping Use    Vaping status: Never Used   Substance and Sexual Activity    Alcohol use: Yes     Comment: WINE 1 GLASS AT NIGHT/socially     Drug use: No   Other Topics Concern    Caffeine Concern Yes     Comment: COFFEE 3-4 DAILY    Exercise Yes     Comment: 1-2 x a week     Seat Belt Yes     Family History:  Family History   Problem Relation Age of Onset    Hypertension Mother     Cancer Mother         Ovarian    Other (Parkinson's Disease) Father      Allergies:  No Known Allergies  Current Meds:  Current Outpatient Medications   Medication Sig Dispense Refill    LOSARTAN 100 MG Oral Tab TAKE 1 TABLET(100 MG) BY MOUTH DAILY 90 tablet 0    ATORVASTATIN 20 MG Oral Tab TAKE 1 TABLET(20 MG) BY MOUTH EVERY NIGHT 90 tablet 0    Omega-3 Fatty Acids (FISH OIL OR) Take 1,200 mg by mouth once daily. 6 daily      Aspirin 81 MG Oral Tab Take 1 tablet (81 mg total) by mouth daily.      Multiple  Vitamin (MULTIVITAMINS OR) Take  by mouth.        Counseling given: Not Answered  Tobacco comments: 53 years ago       REVIEW OF SYSTEMS:   Consitutional: No fevers, chills, sweats  Eye: No recent visual problems  ENMT: No ear pain nasal congestion sore throat  Respiratory: No shortness of breath, cough  Cardiovascular: No chest pain palpitations syncope  Gastrointestinal: No nausea vomiting diarrhea  Genitourinary: No hematuria  Hema/Lymph no bruising tendency, swollen lymph glands  Endocrine: Negative for excessive thirst excessive hunger       Medical, surgical, family, and social histories were reviewed      EXAM:   VITALS:   Vitals:    07/15/24 1025   BP: 108/58   Pulse: 62   Resp: 16   Temp: 97.2 °F (36.2 °C)      GENERAL: well developed, well nourished, in no apparent distress  SKIN: no rashes, no suspicious lesions: Cool and Dry  HEENT: atraumatic, normocephalic, ears and throat are clear    Ears: TM's clear and visible bilaterally, no excess cerumen or erythema.   EYES: Pupils equal round and reactive.  Extraocular motions intact no scleral icterus no injection or drainage  THROAT without erythema tonsillar hypertrophy or exudate.  Uvula midline airway patent  NECK: Given midline.  No JVD or lymphadenopathy supple nontender no meningeal signs   LUNGS: clear to auscultation sounds equal bilaterally no wheezes rales or rhonchi  CARDIO: Regular rate and rhythm without murmurs gallops or rubs       ASSESSMENT AND PLAN:   1. Mixed hyperlipidemia  - Comp Metabolic Panel (14); Future  - Lipid Panel; Future  I did discuss with the patient I would suggest for him to update his blood work.  Need a CMP as well as lipid panel.  He was asked to continue with his atorvastatin as well as watching his diet once he had the results before we will make any further recommendations.  2. Essential hypertension  Patient's blood pressure has been well-controlled he was asked to continue with losartan.  3. Benign prostatic  hyperplasia with urinary hesitancy  Patient has been following up with urology his PSAs have been elevated he has not had any increase in symptoms.  He states that he will be seeing urology next month.    Meds & Refills for this Visit:  Requested Prescriptions      No prescriptions requested or ordered in this encounter       Health Maintenance:  Health Maintenance Due   Topic Date Due    PSA  05/16/2023    COVID-19 Vaccine (7 - 2023-24 season) 12/07/2023       Patient/Caregiver Education: Patient/Caregiver Education: There are no barriers to learning. Medical education done.   Outcome: Patient verbalizes understanding. Patient is notified to call with any questions, complications, allergies, or worsening or changing symptoms.  Patient is to call with any side effects or complications from the treatments as a result of today.     Problem List:  Patient Active Problem List   Diagnosis    Other abnormal blood chemistry    Other symptoms involving cardiovascular system    Microscopic hematuria    Essential hypertension    Right sided sciatica    BPH (benign prostatic hyperplasia)    Screening, anemia, deficiency, iron    Elevated PSA, between 10 and less than 20 ng/ml    Calculus of gallbladder with chronic cholecystitis without obstruction    Preprocedural examination    CKD (chronic kidney disease) stage 3, GFR 30-59 ml/min ([facility])         No follow-ups on file.     Tucker Rand MD    Please note that portions of this note may have been completed with a voice recognition program. Efforts were made to edit the dictations but occasionally words are mis-transcribed.

## 2024-07-25 ENCOUNTER — LAB ENCOUNTER (OUTPATIENT)
Dept: LAB | Age: 79
End: 2024-07-25
Attending: FAMILY MEDICINE
Payer: MEDICARE

## 2024-07-25 DIAGNOSIS — E78.2 MIXED HYPERLIPIDEMIA: ICD-10-CM

## 2024-07-25 LAB
ALBUMIN SERPL-MCNC: 4.4 G/DL (ref 3.2–4.8)
ALBUMIN/GLOB SERPL: 1.7 {RATIO} (ref 1–2)
ALP LIVER SERPL-CCNC: 63 U/L
ALT SERPL-CCNC: 28 U/L
ANION GAP SERPL CALC-SCNC: 5 MMOL/L (ref 0–18)
AST SERPL-CCNC: 19 U/L (ref ?–34)
BILIRUB SERPL-MCNC: 0.7 MG/DL (ref 0.2–1.1)
BUN BLD-MCNC: 18 MG/DL (ref 9–23)
CALCIUM BLD-MCNC: 9.8 MG/DL (ref 8.7–10.4)
CHLORIDE SERPL-SCNC: 106 MMOL/L (ref 98–112)
CHOLEST SERPL-MCNC: 114 MG/DL (ref ?–200)
CO2 SERPL-SCNC: 29 MMOL/L (ref 21–32)
CREAT BLD-MCNC: 1.14 MG/DL
EGFRCR SERPLBLD CKD-EPI 2021: 66 ML/MIN/1.73M2 (ref 60–?)
FASTING PATIENT LIPID ANSWER: YES
FASTING STATUS PATIENT QL REPORTED: YES
GLOBULIN PLAS-MCNC: 2.6 G/DL (ref 2.8–4.4)
GLUCOSE BLD-MCNC: 110 MG/DL (ref 70–99)
HDLC SERPL-MCNC: 39 MG/DL (ref 40–59)
LDLC SERPL CALC-MCNC: 51 MG/DL (ref ?–100)
NONHDLC SERPL-MCNC: 75 MG/DL (ref ?–130)
OSMOLALITY SERPL CALC.SUM OF ELEC: 293 MOSM/KG (ref 275–295)
POTASSIUM SERPL-SCNC: 4.6 MMOL/L (ref 3.5–5.1)
PROT SERPL-MCNC: 7 G/DL (ref 5.7–8.2)
SODIUM SERPL-SCNC: 140 MMOL/L (ref 136–145)
TRIGL SERPL-MCNC: 140 MG/DL (ref 30–149)
VLDLC SERPL CALC-MCNC: 20 MG/DL (ref 0–30)

## 2024-07-25 PROCEDURE — 80053 COMPREHEN METABOLIC PANEL: CPT

## 2024-07-25 PROCEDURE — 80061 LIPID PANEL: CPT

## 2024-07-25 PROCEDURE — 36415 COLL VENOUS BLD VENIPUNCTURE: CPT

## 2024-07-26 DIAGNOSIS — E78.2 MIXED HYPERLIPIDEMIA: Primary | ICD-10-CM

## 2024-07-26 DIAGNOSIS — I10 ESSENTIAL HYPERTENSION: ICD-10-CM

## 2024-07-26 DIAGNOSIS — R97.20 ELEVATED PSA, BETWEEN 10 AND LESS THAN 20 NG/ML: ICD-10-CM

## 2024-09-16 DIAGNOSIS — I10 ESSENTIAL HYPERTENSION: ICD-10-CM

## 2024-09-16 DIAGNOSIS — R93.1 ABNORMAL SCREENING CARDIAC CT: ICD-10-CM

## 2024-09-16 DIAGNOSIS — E78.2 MIXED HYPERLIPIDEMIA: ICD-10-CM

## 2024-09-16 RX ORDER — ATORVASTATIN CALCIUM 20 MG/1
20 TABLET, FILM COATED ORAL NIGHTLY
Qty: 90 TABLET | Refills: 0 | Status: SHIPPED | OUTPATIENT
Start: 2024-09-16

## 2024-09-16 RX ORDER — LOSARTAN POTASSIUM 100 MG/1
100 TABLET ORAL DAILY
Qty: 90 TABLET | Refills: 0 | Status: SHIPPED | OUTPATIENT
Start: 2024-09-16

## 2024-11-07 ENCOUNTER — OFFICE VISIT (OUTPATIENT)
Dept: FAMILY MEDICINE CLINIC | Facility: CLINIC | Age: 79
End: 2024-11-07
Payer: COMMERCIAL

## 2024-11-07 VITALS
HEIGHT: 75 IN | HEART RATE: 70 BPM | SYSTOLIC BLOOD PRESSURE: 114 MMHG | TEMPERATURE: 97 F | DIASTOLIC BLOOD PRESSURE: 76 MMHG | WEIGHT: 232 LBS | RESPIRATION RATE: 16 BRPM | BODY MASS INDEX: 28.85 KG/M2

## 2024-11-07 DIAGNOSIS — E78.2 MIXED HYPERLIPIDEMIA: Primary | ICD-10-CM

## 2024-11-07 PROCEDURE — 99213 OFFICE O/P EST LOW 20 MIN: CPT | Performed by: FAMILY MEDICINE

## 2024-11-07 PROCEDURE — 3078F DIAST BP <80 MM HG: CPT | Performed by: FAMILY MEDICINE

## 2024-11-07 PROCEDURE — G2211 COMPLEX E/M VISIT ADD ON: HCPCS | Performed by: FAMILY MEDICINE

## 2024-11-07 PROCEDURE — 3074F SYST BP LT 130 MM HG: CPT | Performed by: FAMILY MEDICINE

## 2024-11-07 PROCEDURE — 1170F FXNL STATUS ASSESSED: CPT | Performed by: FAMILY MEDICINE

## 2024-11-07 PROCEDURE — 1159F MED LIST DOCD IN RCRD: CPT | Performed by: FAMILY MEDICINE

## 2024-11-07 PROCEDURE — 1160F RVW MEDS BY RX/DR IN RCRD: CPT | Performed by: FAMILY MEDICINE

## 2024-11-07 PROCEDURE — 3008F BODY MASS INDEX DOCD: CPT | Performed by: FAMILY MEDICINE

## 2024-11-07 NOTE — PROGRESS NOTES
[facility] Family Medicine Office Note  Chief Complaint:   Chief Complaint   Patient presents with    Medication Follow-Up    Hypertension       HPI:   This is a 79 year old male coming in for medication follow-up patient states that he has been taking his cholesterol medication as prescribed he has not had any issues with the medication.  He does have a history of hypertension hypercholesterolemia.      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    High blood pressure    High cholesterol    Hx of oral surgery    Visual impairment    contacts     Past Surgical History:   Procedure Laterality Date    Cholecystectomy  03/2021    Colonoscopy  09/26/2018    [place] teeth removed      asa a teen ager     Social History:  Social History     Socioeconomic History    Marital status:    Tobacco Use    Smoking status: Never    Smokeless tobacco: Never    Tobacco comments:     53 years ago   Vaping Use    Vaping status: Never Used   Substance and Sexual Activity    Alcohol use: Yes     Comment: WINE 1 GLASS AT NIGHT/socially     Drug use: No   Other Topics Concern    Caffeine Concern Yes     Comment: COFFEE 3-4 DAILY    Exercise Yes     Comment: 1-2 x a week     Seat Belt Yes     Family History:  Family History   Problem Relation Age of Onset    Hypertension Mother     Cancer Mother         Ovarian    Other (Parkinson's Disease) Father      Allergies:  Allergies[1]  Current Meds:  Current Outpatient Medications   Medication Sig Dispense Refill    LOSARTAN 100 MG Oral Tab TAKE 1 TABLET(100 MG) BY MOUTH DAILY 90 tablet 0    ATORVASTATIN 20 MG Oral Tab TAKE 1 TABLET(20 MG) BY MOUTH EVERY NIGHT 90 tablet 0    Omega-3 Fatty Acids (FISH OIL OR) Take 1,200 mg by mouth once daily. 6 daily      Aspirin 81 MG Oral Tab Take 1 tablet (81 mg total) by mouth daily.      Multiple Vitamin (MULTIVITAMINS OR) Take  by mouth.        Counseling given: Not  Answered  Tobacco comments: 53 years ago       REVIEW OF SYSTEMS:   Consitutional: No fevers, chills, sweats  Eye: No recent visual problems  ENMT: No ear pain nasal congestion sore throat  Respiratory: No shortness of breath, cough  Cardiovascular: No chest pain palpitations syncope  Gastrointestinal: No nausea vomiting diarrhea  Genitourinary: No hematuria  Hema/Lymph no bruising tendency, swollen lymph glands       Medical, surgical, family, and social histories were reviewed      EXAM:   VITALS:   Vitals:    11/07/24 1017   BP: 114/76   Pulse: 70   Resp: 16   Temp: 97 °F (36.1 °C)      GENERAL: well developed, well nourished, in no apparent distress  SKIN: no rashes, no suspicious lesions: Cool and Dry  HEENT: atraumatic, normocephalic, ears and throat are clear    Ears: TM's clear and visible bilaterally, no excess cerumen or erythema.   EYES: Pupils equal round and reactive.  Extraocular motions intact no scleral icterus no injection or drainage  THROAT without erythema tonsillar hypertrophy or exudate.  Uvula midline airway patent  NECK: Given midline.  No JVD or lymphadenopathy supple nontender no meningeal signs   LUNGS: clear to auscultation sounds equal bilaterally no wheezes rales or rhonchi  CARDIO: Regular rate and rhythm without murmurs gallops or rubs       ASSESSMENT AND PLAN:   1. Mixed hyperlipidemia  - Comp Metabolic Panel (14); Future  - Lipid Panel; Future  Did discuss with the patient to continue with his current medications I did discuss with him to update his blood work you need a CMP as well as lipid panel he was asked to follow-up in January for his Medicare wellness visit.  We did discuss his PSA he has been following up with urology and they have suggested to discontinue testing.    Meds & Refills for this Visit:  Requested Prescriptions      No prescriptions requested or ordered in this encounter       Health Maintenance:  Health Maintenance Due   Topic Date Due    PSA  05/16/2023        Patient/Caregiver Education: Patient/Caregiver Education: There are no barriers to learning. Medical education done.   Outcome: Patient verbalizes understanding. Patient is notified to call with any questions, complications, allergies, or worsening or changing symptoms.  Patient is to call with any side effects or complications from the treatments as a result of today.     Problem List:  Patient Active Problem List   Diagnosis    Other abnormal blood chemistry    Other symptoms involving cardiovascular system    Microscopic hematuria    Essential hypertension    Right sided sciatica    BPH (benign prostatic hyperplasia)    Screening, anemia, deficiency, iron    Elevated PSA, between 10 and less than 20 ng/ml    Calculus of gallbladder with chronic cholecystitis without obstruction    Preprocedural examination    CKD (chronic kidney disease) stage 3, GFR 30-59 ml/min ([facility])         No follow-ups on file.     Tucker Rand MD    Please note that portions of this note may have been completed with a voice recognition program. Efforts were made to edit the dictations but occasionally words are mis-transcribed.       [1] No Known Allergies

## 2024-11-12 ENCOUNTER — LAB ENCOUNTER (OUTPATIENT)
Dept: LAB | Age: 79
End: 2024-11-12
Attending: FAMILY MEDICINE
Payer: MEDICARE

## 2024-11-12 DIAGNOSIS — E78.2 MIXED HYPERLIPIDEMIA: ICD-10-CM

## 2024-11-12 LAB
ALBUMIN SERPL-MCNC: 4.5 G/DL (ref 3.2–4.8)
ALBUMIN/GLOB SERPL: 1.8 {RATIO} (ref 1–2)
ALP LIVER SERPL-CCNC: 63 U/L
ALT SERPL-CCNC: 24 U/L
ANION GAP SERPL CALC-SCNC: 2 MMOL/L (ref 0–18)
AST SERPL-CCNC: 20 U/L (ref ?–34)
BILIRUB SERPL-MCNC: 1 MG/DL (ref 0.2–1.1)
BUN BLD-MCNC: 15 MG/DL (ref 9–23)
CALCIUM BLD-MCNC: 10 MG/DL (ref 8.7–10.4)
CHLORIDE SERPL-SCNC: 108 MMOL/L (ref 98–112)
CHOLEST SERPL-MCNC: 108 MG/DL (ref ?–200)
CO2 SERPL-SCNC: 32 MMOL/L (ref 21–32)
CREAT BLD-MCNC: 1.13 MG/DL
EGFRCR SERPLBLD CKD-EPI 2021: 67 ML/MIN/1.73M2 (ref 60–?)
FASTING PATIENT LIPID ANSWER: YES
FASTING STATUS PATIENT QL REPORTED: YES
GLOBULIN PLAS-MCNC: 2.5 G/DL (ref 2–3.5)
GLUCOSE BLD-MCNC: 109 MG/DL (ref 70–99)
HDLC SERPL-MCNC: 38 MG/DL (ref 40–59)
LDLC SERPL CALC-MCNC: 46 MG/DL (ref ?–100)
NONHDLC SERPL-MCNC: 70 MG/DL (ref ?–130)
OSMOLALITY SERPL CALC.SUM OF ELEC: 295 MOSM/KG (ref 275–295)
POTASSIUM SERPL-SCNC: 5 MMOL/L (ref 3.5–5.1)
PROT SERPL-MCNC: 7 G/DL (ref 5.7–8.2)
SODIUM SERPL-SCNC: 142 MMOL/L (ref 136–145)
TRIGL SERPL-MCNC: 138 MG/DL (ref 30–149)
VLDLC SERPL CALC-MCNC: 19 MG/DL (ref 0–30)

## 2024-11-12 PROCEDURE — 80053 COMPREHEN METABOLIC PANEL: CPT

## 2024-11-12 PROCEDURE — 80061 LIPID PANEL: CPT

## 2024-11-12 PROCEDURE — 36415 COLL VENOUS BLD VENIPUNCTURE: CPT

## 2024-12-14 DIAGNOSIS — I10 ESSENTIAL HYPERTENSION: ICD-10-CM

## 2024-12-14 DIAGNOSIS — R93.1 ABNORMAL SCREENING CARDIAC CT: ICD-10-CM

## 2024-12-14 DIAGNOSIS — E78.2 MIXED HYPERLIPIDEMIA: ICD-10-CM

## 2024-12-16 RX ORDER — LOSARTAN POTASSIUM 100 MG/1
100 TABLET ORAL DAILY
Qty: 90 TABLET | Refills: 0 | Status: SHIPPED | OUTPATIENT
Start: 2024-12-16

## 2024-12-16 RX ORDER — ATORVASTATIN CALCIUM 20 MG/1
20 TABLET, FILM COATED ORAL NIGHTLY
Qty: 90 TABLET | Refills: 0 | Status: SHIPPED | OUTPATIENT
Start: 2024-12-16

## 2025-01-23 ENCOUNTER — OFFICE VISIT (OUTPATIENT)
Dept: FAMILY MEDICINE CLINIC | Facility: CLINIC | Age: 80
End: 2025-01-23
Payer: COMMERCIAL

## 2025-01-23 VITALS
RESPIRATION RATE: 16 BRPM | SYSTOLIC BLOOD PRESSURE: 118 MMHG | BODY MASS INDEX: 29.22 KG/M2 | TEMPERATURE: 97 F | HEART RATE: 76 BPM | DIASTOLIC BLOOD PRESSURE: 66 MMHG | WEIGHT: 235 LBS | HEIGHT: 75 IN

## 2025-01-23 DIAGNOSIS — I10 ESSENTIAL HYPERTENSION: ICD-10-CM

## 2025-01-23 DIAGNOSIS — E78.49 OTHER HYPERLIPIDEMIA: ICD-10-CM

## 2025-01-23 DIAGNOSIS — Z00.00 MEDICARE ANNUAL WELLNESS VISIT, SUBSEQUENT: Primary | ICD-10-CM

## 2025-01-23 PROBLEM — N18.30 CKD (CHRONIC KIDNEY DISEASE) STAGE 3, GFR 30-59 ML/MIN (HCC): Chronic | Status: RESOLVED | Noted: 2024-01-25 | Resolved: 2025-01-23

## 2025-01-23 NOTE — PROGRESS NOTES
Subjective:   Redd Daniels is a 79 year old male who presents for a MA AHA (Medicare Advantage Annual Health Assessment) and Medicare Subsequent Annual Wellness visit (Pt already had Initial Annual Wellness) and scheduled follow up of multiple significant but stable problems.         Patient has a past medical history of hypertension hypercholesterolemia states that he has been feeling well he has been taking his medications as prescribed denies any chest pain nausea vomiting diarrhea constipation.        History/Other:   Fall Risk Assessment:   He has been screened for Falls and is low risk.      Cognitive Assessment:   He had a completely normal cognitive assessment - see flowsheet entries       Functional Ability/Status:   Redd Daniels has some abnormal functions as listed below:  He has Hearing problems based on screening of functional status.      Depression Screening (PHQ):  PHQ-2 SCORE: 0  , done 1/23/2025         Advanced Directives:   He does have a Living Will but we do NOT have it on file in Epic.    He does have a POA but we do NOT have it on file in Epic.    Discussed Advance Care Planning with patient (and family/surrogate if present). Standard forms made available to patient in After Visit Summary.      Patient Active Problem List   Diagnosis    Other abnormal blood chemistry    Other symptoms involving cardiovascular system    Microscopic hematuria    Essential hypertension    Right sided sciatica    BPH (benign prostatic hyperplasia)    Screening, anemia, deficiency, iron    Elevated PSA, between 10 and less than 20 ng/ml    Calculus of gallbladder with chronic cholecystitis without obstruction    Preprocedural examination     Allergies:  He has No Known Allergies.    Current Medications:  Outpatient Medications Marked as Taking for the 1/23/25 encounter (Office Visit) with Tucker Rand MD   Medication Sig    ATORVASTATIN 20 MG Oral Tab TAKE 1 TABLET(20 MG) BY MOUTH EVERY  NIGHT    LOSARTAN 100 MG Oral Tab TAKE 1 TABLET(100 MG) BY MOUTH DAILY    Omega-3 Fatty Acids (FISH OIL OR) Take 1,200 mg by mouth once daily. 6 daily    Aspirin 81 MG Oral Tab Take 1 tablet (81 mg total) by mouth daily.    Multiple Vitamin (MULTIVITAMINS OR) Take  by mouth.       Medical History:  He  has a past medical history of Calculus of kidney, CKD (chronic kidney disease) stage 3, GFR 30-59 ml/min ([facility]) (01/25/2024), Hearing impairment, High blood pressure, High cholesterol, oral surgery, and Visual impairment.  Surgical History:  He  has a past surgical history that includes wisdom teeth removed; colonoscopy (09/26/2018); and cholecystectomy (03/2021).   Family History:  His family history includes Cancer in his mother; Hypertension in his mother; Parkinson's Disease in his father.  Social History:  He  reports that he has never smoked. He has never used smokeless tobacco. He reports current alcohol use. He reports that he does not use drugs.    Tobacco:  He has never smoked tobacco.    CAGE Alcohol Screen:   CAGE screening score of 0 on 1/20/2025, showing low risk of alcohol abuse.      Patient Care Team:  Tucker Rand MD as PCP - General  Balwinder Pryor MD (GASTROENTEROLOGY)    Review of Systems  Consitutional: No fevers, chills, sweats  Eye: No recent visual problems  ENMT: No ear pain nasal congestion sore throat  Respiratory: No shortness of breath, cough  Cardiovascular: No chest pain palpitations syncope  Gastrointestinal: No nausea vomiting diarrhea  Genitourinary: No hematuria  Hema/Lymph no bruising tendency, swollen lymph glands  Endocrine: Negative for excessive thirst excessive hunger  Musculoskeletal: No back pain neck pain joint pain muscle pain decreased range of motion  Integumentary: No rash, pruritus, abrasions  Neurologic: Alert and oriented x4  Psychiatric: No anxiety, depression    Medical, surgical, family, and social histories were reviewed      Objective:   Physical  Exam      VITALS: Vitals reviewed   GENERAL: well developed, well nourished, in no apparent distress  SKIN: no rashes, no suspicious lesions: Cool and Dry  HEENT: atraumatic, normocephalic, ears and throat are clear bilateral tympanic membranes lucent nonbulging intact nose without bleeding purulent discharge or septal hematoma.    EYES: Pupils equal round and reactive.  Extraocular motions intact no scleral icterus no injection or drainage  THROAT without erythema tonsillar hypertrophy or exudate.  Uvula midline airway patent                Hearing Assessed via: Whispered Voice  NECK: trachea midline.  No JVD or lymphadenopathy supple nontender no meningeal signs   LUNGS: clear to auscultation sounds equal bilaterally no wheezes rales or rhonchi  CARDIO: Regular rate and rhythm without murmurs gallops or rubs  GI: Soft nontender nondistended no hepatomegaly palpable masses.  No guarding.  BACK non tender without deformity or crepitus no flank tenderness  EXTREMITIES: no cyanosis, clubbing or edema no joint tenderness effusion or edema noted.  No calf tenderness negative Homans' sign bilaterally  NEURO: Awake and alert.  Cranial nerves II through XII intact motor and sensory grossly within normal limits.  5 out of 5 muscle strength in all muscle groups.  Normal speech.  PSYCHIATRIC: Awake, alert and oriented x3, cooperative appropriate mood and affect.  Judgment intact          /66 (BP Location: Left arm, Patient Position: Sitting, Cuff Size: adult)   Pulse 76   Temp 97.2 °F (36.2 °C) (Temporal)   Resp 16   Ht 6' 3\" (1.905 m)   Wt 235 lb (106.6 kg)   BMI 29.37 kg/m²  Estimated body mass index is 29.37 kg/m² as calculated from the following:    Height as of this encounter: 6' 3\" (1.905 m).    Weight as of this encounter: 235 lb (106.6 kg).    Medicare Hearing Assessment:   Hearing Screening    Time taken: 1/23/2025  9:48 AM  Entry User: Yvonne Russ MA  Screening Method: Finger Rub  Finger Rub Result:  Fail         Visual Acuity:   Right Eye Visual Acuity: Corrected Right Eye Chart Acuity: 20/30   Left Eye Visual Acuity: Corrected Left Eye Chart Acuity: 20/40   Both Eyes Visual Acuity: Corrected Both Eyes Chart Acuity: 20/30   Able To Tolerate Visual Acuity: Yes        Assessment & Plan:   Redd Daniels is a 79 year old male who presents for a Medicare Assessment.     1. Medicare annual wellness visit, subsequent (Primary)    Patient is up-to-date with all preventative measures I did discuss with the patient that I would suggest for him to provide us his medical power of .  Was asked to continue to take his medications as prescribed.  He was asked to follow-up yearly for regular physical exams or for any other acute concern.        2. Essential hypertension      Blood pressure has remained well-controlled he is currently on losartan he has not had any chest pain headache.  He was asked to continue with the medication as prescribed      3. Other hyperlipidemia  Patient LDL has remained well-controlled he has continued to take his atorvastatin as prescribed.  Was asked to follow-up in the next 6 months to update blood work you need a CMP as well as lipid panel        The patient presented today for Medicare annual wellness visit.  During the course of today's visit the health risk assessment past medical family and social histories were updated and reviewed with the patient.  The patient was educated and counseled about appropriate screening and preventative services and these were ordered as appropriate.  Referrals for educational and counseling services were made where indicated.  Advance directives were discussed.  A copy of the recommended preventative screenings as well as discharge instructions were provided to the patient.  End-of-life planning was discussed advanced directives were also discussed and are in place.    The patient indicates understanding of these issues and agrees to the  plan.  Patient reassured.  Reinforced healthy diet, lifestyle, and exercise.      No follow-ups on file.     Tucker Rand MD, 1/23/2025     Supplementary Documentation:   General Health:  In the past six months, have you lost more than 10 pounds without trying?: (Patient-Rptd) 2 - No  Has your appetite been poor?: (Patient-Rptd) No  Type of Diet: (Patient-Rptd) Balanced  How does the patient maintain a good energy level?: (Patient-Rptd) Appropriate Exercise  How would you describe your daily physical activity?: (Patient-Rptd) Moderate  How would you describe your current health state?: (Patient-Rptd) Good  How do you maintain positive mental well-being?: (Patient-Rptd) Social Interaction;Visiting Friends;Visiting Family  On a scale of 0 to 10, with 0 being no pain and 10 being severe pain, what is your pain level?: (Patient-Rptd) 1 - (Mild)  In the past six months, have you experienced urine leakage?: (Patient-Rptd) 0-No  At any time do you feel concerned for the safety/well-being of yourself and/or your children, in your home or elsewhere?: (Patient-Rptd) No  Have you had any immunizations at another office such as Influenza, Hepatitis B, Tetanus, or Pneumococcal?: (Patient-Rptd) Yes    Health Maintenance   Topic Date Due    Annual Well Visit  01/01/2025    PSA  11/18/2025 (Originally 5/16/2023)    Colorectal Cancer Screening  12/13/2026    Influenza Vaccine  Completed    Annual Depression Screening  Completed    Fall Risk Screening (Annual)  Completed    Pneumococcal Vaccine: 50+ Years  Completed    Zoster Vaccines  Completed    COVID-19 Vaccine  Completed    Meningococcal B Vaccine  Aged Out

## 2025-03-19 DIAGNOSIS — E78.2 MIXED HYPERLIPIDEMIA: ICD-10-CM

## 2025-03-19 DIAGNOSIS — I10 ESSENTIAL HYPERTENSION: ICD-10-CM

## 2025-03-19 DIAGNOSIS — R93.1 ABNORMAL SCREENING CARDIAC CT: ICD-10-CM

## 2025-03-20 RX ORDER — ATORVASTATIN CALCIUM 20 MG/1
20 TABLET, FILM COATED ORAL NIGHTLY
Qty: 90 TABLET | Refills: 0 | Status: SHIPPED | OUTPATIENT
Start: 2025-03-20

## 2025-03-20 RX ORDER — LOSARTAN POTASSIUM 100 MG/1
100 TABLET ORAL DAILY
Qty: 90 TABLET | Refills: 0 | Status: SHIPPED | OUTPATIENT
Start: 2025-03-20

## 2025-04-22 ENCOUNTER — MED REC SCAN ONLY (OUTPATIENT)
Dept: FAMILY MEDICINE CLINIC | Facility: CLINIC | Age: 80
End: 2025-04-22

## 2025-04-28 ENCOUNTER — TELEPHONE (OUTPATIENT)
Dept: FAMILY MEDICINE CLINIC | Facility: CLINIC | Age: 80
End: 2025-04-28

## 2025-04-28 NOTE — TELEPHONE ENCOUNTER
Appointment for: Redd Daniels ([id number])  Visit type: MYCHART EXAM (2964)  4/30/2025 8:45 AM (15 minutes) with Tucker Rand in [facility] [place] [place] [address]     Patient comments:  Re-occurring occasional pain in upper abdomen

## 2025-04-28 NOTE — TELEPHONE ENCOUNTER
The patient called back. He will keep the appointment with Dr. Rand on Wednesday at 8:45 am. The abdominal pain he was having has subsided. Patient would still like to follow up with Dr. Rand.

## 2025-04-30 ENCOUNTER — OFFICE VISIT (OUTPATIENT)
Dept: FAMILY MEDICINE CLINIC | Facility: CLINIC | Age: 80
End: 2025-04-30
Payer: COMMERCIAL

## 2025-04-30 VITALS
HEART RATE: 70 BPM | TEMPERATURE: 98 F | HEIGHT: 75 IN | WEIGHT: 232 LBS | BODY MASS INDEX: 28.85 KG/M2 | RESPIRATION RATE: 16 BRPM | DIASTOLIC BLOOD PRESSURE: 60 MMHG | SYSTOLIC BLOOD PRESSURE: 112 MMHG

## 2025-04-30 DIAGNOSIS — R10.31 RIGHT LOWER QUADRANT ABDOMINAL PAIN: Primary | ICD-10-CM

## 2025-04-30 PROCEDURE — 3078F DIAST BP <80 MM HG: CPT | Performed by: FAMILY MEDICINE

## 2025-04-30 PROCEDURE — 99214 OFFICE O/P EST MOD 30 MIN: CPT | Performed by: FAMILY MEDICINE

## 2025-04-30 PROCEDURE — 3008F BODY MASS INDEX DOCD: CPT | Performed by: FAMILY MEDICINE

## 2025-04-30 PROCEDURE — 1160F RVW MEDS BY RX/DR IN RCRD: CPT | Performed by: FAMILY MEDICINE

## 2025-04-30 PROCEDURE — 1159F MED LIST DOCD IN RCRD: CPT | Performed by: FAMILY MEDICINE

## 2025-04-30 PROCEDURE — G2211 COMPLEX E/M VISIT ADD ON: HCPCS | Performed by: FAMILY MEDICINE

## 2025-04-30 PROCEDURE — 3074F SYST BP LT 130 MM HG: CPT | Performed by: FAMILY MEDICINE

## 2025-04-30 NOTE — PROGRESS NOTES
[facility] Family Medicine Office Note  Chief Complaint:   Chief Complaint   Patient presents with    Abdominal Pain     Pt c/o pain in the abdomen. Had pain similar prior to when gallbladder was removed.        HPI:   This is a 80 year old male coming in for right lower abdominal pain the patient states that this happened on Friday.  He has not had any other symptoms since then.  He states that he has not noticed any association with food does not have any changes in bowel movements denies any nausea vomiting.      Past Medical History[1]  Past Surgical History[2]  Social History:  Short Social Hx on File[3]  Family History:  Family History[4]  Allergies:  Allergies[5]  Current Meds:  Current Medications[6]   Counseling given: Not Answered  Tobacco comments: 53 years ago       REVIEW OF SYSTEMS:   Consitutional: No fevers, chills, sweats  Eye: No recent visual problems  ENMT: No ear pain nasal congestion sore throat  Respiratory: No shortness of breath, cough  Cardiovascular: No chest pain palpitations syncope  Gastrointestinal: No nausea vomiting diarrhea positive right lower abdominal pain  Genitourinary: No hematuria  Hema/Lymph no bruising tendency, swollen lymph glands  Endocrine: Negative for excessive thirst excessive hunger  Musculoskeletal: No back pain neck pain joint pain muscle pain decreased range of motion      Medical, surgical, family, and social histories were reviewed      EXAM:   VITALS:   Vitals:    04/30/25 0841   BP: 112/60   Pulse: 70   Resp: 16   Temp: 98 °F (36.7 °C)      GENERAL: well developed, well nourished, in no apparent distress  SKIN: no rashes, no suspicious lesions: Cool and Dry  HEENT: atraumatic, normocephalic, ears and throat are clear    Ears: TM's clear and visible bilaterally, no excess cerumen or erythema.   EYES: Pupils equal round and reactive.  Extraocular motions intact no scleral icterus no injection or drainage  THROAT without erythema tonsillar hypertrophy  or exudate.  Uvula midline airway patent  NECK: Given midline.  No JVD or lymphadenopathy supple nontender no meningeal signs   LUNGS: clear to auscultation sounds equal bilaterally no wheezes rales or rhonchi  CARDIO: Regular rate and rhythm without murmurs gallops or rubs  GI: Soft nontender nondistended no hepatomegaly palpable masses.  No guarding.         ASSESSMENT AND PLAN:   1. Right lower quadrant abdominal pain  - US ABDOMEN COMPLETE (CPT=76700); Future  I did discuss with the patient I would like to complete an ultrasound of the abdomen.  I did discuss that this may be fluid related he was asked to watch for any foods spicy foods to see if this irritates the area.  Once we have the results to go over the make any further recommendations    Meds & Refills for this Visit:  Requested Prescriptions      No prescriptions requested or ordered in this encounter       Health Maintenance:  Health Maintenance Due   Topic Date Due    COVID-19 Vaccine (10 - 2024-25 season) 04/08/2025       Patient/Caregiver Education: Patient/Caregiver Education: There are no barriers to learning. Medical education done.   Outcome: Patient verbalizes understanding. Patient is notified to call with any questions, complications, allergies, or worsening or changing symptoms.  Patient is to call with any side effects or complications from the treatments as a result of today.     Problem List:  Problem List[7]      No follow-ups on file.     Tucker Rand MD    Please note that portions of this note may have been completed with a voice recognition program. Efforts were made to edit the dictations but occasionally words are mis-transcribed.       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    High blood pressure    High cholesterol    Hx of oral surgery    Visual impairment    contacts   [2]   Past  Surgical History:  Procedure Laterality Date    Cholecystectomy  03/2021    Colonoscopy  09/26/2018    [place] teeth removed      asa a teen ager   [3]   Social History  Socioeconomic History    Marital status:    Tobacco Use    Smoking status: Never    Smokeless tobacco: Never    Tobacco comments:     53 years ago   Vaping Use    Vaping status: Never Used   Substance and Sexual Activity    Alcohol use: Yes     Comment: WINE 1 GLASS AT NIGHT/socially     Drug use: No   Other Topics Concern    Caffeine Concern Yes     Comment: COFFEE 3-4 DAILY    Exercise Yes     Comment: 1-2 x a week     Seat Belt Yes   [4]   Family History  Problem Relation Age of Onset    Hypertension Mother     Cancer Mother         Ovarian    Other (Parkinson's Disease) Father    [5] No Known Allergies  [6]   Current Outpatient Medications   Medication Sig Dispense Refill    atorvastatin 20 MG Oral Tab Take 1 tablet (20 mg total) by mouth nightly. 90 tablet 0    losartan 100 MG Oral Tab Take 1 tablet (100 mg total) by mouth daily. 90 tablet 0    Omega-3 Fatty Acids (FISH OIL OR) Take 1,200 mg by mouth once daily. 6 daily      Aspirin 81 MG Oral Tab Take 1 tablet (81 mg total) by mouth daily.      Multiple Vitamin (MULTIVITAMINS OR) Take  by mouth.     [7]   Patient Active Problem List  Diagnosis    Other abnormal blood chemistry    Other symptoms involving cardiovascular system    Microscopic hematuria    Essential hypertension    Right sided sciatica    BPH (benign prostatic hyperplasia)    Screening, anemia, deficiency, iron    Elevated PSA, between 10 and less than 20 ng/ml    Calculus of gallbladder with chronic cholecystitis without obstruction    Preprocedural examination

## 2025-05-06 ENCOUNTER — HOSPITAL ENCOUNTER (OUTPATIENT)
Dept: ULTRASOUND IMAGING | Age: 80
Discharge: HOME | End: 2025-05-06
Attending: FAMILY MEDICINE
Payer: MEDICARE

## 2025-05-06 DIAGNOSIS — R10.31 RIGHT LOWER QUADRANT ABDOMINAL PAIN: ICD-10-CM

## 2025-05-06 PROCEDURE — 76700 US EXAM ABDOM COMPLETE: CPT | Performed by: FAMILY MEDICINE

## 2025-05-12 ENCOUNTER — MED REC SCAN ONLY (OUTPATIENT)
Dept: FAMILY MEDICINE CLINIC | Facility: CLINIC | Age: 80
End: 2025-05-12

## 2025-06-11 DIAGNOSIS — E78.2 MIXED HYPERLIPIDEMIA: ICD-10-CM

## 2025-06-11 DIAGNOSIS — I10 ESSENTIAL HYPERTENSION: ICD-10-CM

## 2025-06-11 DIAGNOSIS — R93.1 ABNORMAL SCREENING CARDIAC CT: ICD-10-CM

## 2025-06-11 RX ORDER — LOSARTAN POTASSIUM 100 MG/1
100 TABLET ORAL DAILY
Qty: 90 TABLET | Refills: 0 | OUTPATIENT
Start: 2025-06-11

## 2025-06-11 RX ORDER — LOSARTAN POTASSIUM 100 MG/1
100 TABLET ORAL DAILY
Qty: 30 TABLET | Refills: 0 | Status: SHIPPED | OUTPATIENT
Start: 2025-06-11

## 2025-06-11 RX ORDER — ATORVASTATIN CALCIUM 20 MG/1
20 TABLET, FILM COATED ORAL NIGHTLY
Qty: 30 TABLET | Refills: 0 | Status: SHIPPED | OUTPATIENT
Start: 2025-06-11

## 2025-06-11 NOTE — TELEPHONE ENCOUNTER
Requested Prescriptions     Pending Prescriptions Disp Refills    LOSARTAN 100 MG Oral Tab [Pharmacy Med Name: LOSARTAN 100MG TABLETS] 90 tablet 0     Sig: TAKE 1 TABLET(100 MG) BY MOUTH DAILY    ATORVASTATIN 20 MG Oral Tab [Pharmacy Med Name: ATORVASTATIN 20MG TABLETS] 90 tablet 0     Sig: TAKE 1 TABLET(20 MG) BY MOUTH EVERY NIGHT     Last refill 3/20/25 #90  LOV 1/23/25  Future Appointments   Date Time Provider Department Center   6/24/2025 10:15 AM [place] Rand [place] [facility] [place] [id number]     #30 refilled per protocol

## 2025-06-23 ENCOUNTER — LAB ENCOUNTER (OUTPATIENT)
Dept: LAB | Age: 80
End: 2025-06-23
Attending: FAMILY MEDICINE
Payer: MEDICARE

## 2025-06-23 DIAGNOSIS — E78.49 OTHER HYPERLIPIDEMIA: ICD-10-CM

## 2025-06-23 LAB
ALBUMIN SERPL-MCNC: 4.6 G/DL (ref 3.2–4.8)
ALBUMIN/GLOB SERPL: 1.9 {RATIO} (ref 1–2)
ALP LIVER SERPL-CCNC: 54 U/L (ref 45–117)
ALT SERPL-CCNC: 25 U/L (ref 10–49)
ANION GAP SERPL CALC-SCNC: 11 MMOL/L (ref 0–18)
AST SERPL-CCNC: 22 U/L (ref ?–34)
BILIRUB SERPL-MCNC: 0.7 MG/DL (ref 0.2–1.1)
BUN BLD-MCNC: 17 MG/DL (ref 9–23)
CALCIUM BLD-MCNC: 9.6 MG/DL (ref 8.7–10.6)
CHLORIDE SERPL-SCNC: 106 MMOL/L (ref 98–112)
CHOLEST SERPL-MCNC: 111 MG/DL (ref ?–200)
CO2 SERPL-SCNC: 26 MMOL/L (ref 21–32)
CREAT BLD-MCNC: 1.18 MG/DL (ref 0.7–1.3)
EGFRCR SERPLBLD CKD-EPI 2021: 63 ML/MIN/1.73M2 (ref 60–?)
FASTING PATIENT LIPID ANSWER: YES
FASTING STATUS PATIENT QL REPORTED: YES
GLOBULIN PLAS-MCNC: 2.4 G/DL (ref 2–3.5)
GLUCOSE BLD-MCNC: 107 MG/DL (ref 70–99)
HDLC SERPL-MCNC: 37 MG/DL (ref 40–59)
LDLC SERPL CALC-MCNC: 44 MG/DL (ref ?–100)
NONHDLC SERPL-MCNC: 74 MG/DL (ref ?–130)
OSMOLALITY SERPL CALC.SUM OF ELEC: 298 MOSM/KG (ref 275–295)
POTASSIUM SERPL-SCNC: 4.1 MMOL/L (ref 3.5–5.1)
PROT SERPL-MCNC: 7 G/DL (ref 5.7–8.2)
SODIUM SERPL-SCNC: 143 MMOL/L (ref 136–145)
TRIGL SERPL-MCNC: 179 MG/DL (ref 30–149)
VLDLC SERPL CALC-MCNC: 25 MG/DL (ref 0–30)

## 2025-06-23 PROCEDURE — 36415 COLL VENOUS BLD VENIPUNCTURE: CPT

## 2025-06-23 PROCEDURE — 80053 COMPREHEN METABOLIC PANEL: CPT

## 2025-06-23 PROCEDURE — 80061 LIPID PANEL: CPT

## 2025-06-24 ENCOUNTER — OFFICE VISIT (OUTPATIENT)
Dept: FAMILY MEDICINE CLINIC | Facility: CLINIC | Age: 80
End: 2025-06-24
Payer: COMMERCIAL

## 2025-06-24 VITALS
HEART RATE: 73 BPM | BODY MASS INDEX: 28.6 KG/M2 | HEIGHT: 75 IN | SYSTOLIC BLOOD PRESSURE: 130 MMHG | DIASTOLIC BLOOD PRESSURE: 64 MMHG | TEMPERATURE: 98 F | OXYGEN SATURATION: 97 % | WEIGHT: 230 LBS

## 2025-06-24 DIAGNOSIS — I10 ESSENTIAL HYPERTENSION: ICD-10-CM

## 2025-06-24 DIAGNOSIS — R93.1 ABNORMAL SCREENING CARDIAC CT: ICD-10-CM

## 2025-06-24 DIAGNOSIS — E78.2 MIXED HYPERLIPIDEMIA: ICD-10-CM

## 2025-06-24 PROCEDURE — 3008F BODY MASS INDEX DOCD: CPT | Performed by: FAMILY MEDICINE

## 2025-06-24 PROCEDURE — 3075F SYST BP GE 130 - 139MM HG: CPT | Performed by: FAMILY MEDICINE

## 2025-06-24 PROCEDURE — G2211 COMPLEX E/M VISIT ADD ON: HCPCS | Performed by: FAMILY MEDICINE

## 2025-06-24 PROCEDURE — 1160F RVW MEDS BY RX/DR IN RCRD: CPT | Performed by: FAMILY MEDICINE

## 2025-06-24 PROCEDURE — 99214 OFFICE O/P EST MOD 30 MIN: CPT | Performed by: FAMILY MEDICINE

## 2025-06-24 PROCEDURE — 1159F MED LIST DOCD IN RCRD: CPT | Performed by: FAMILY MEDICINE

## 2025-06-24 PROCEDURE — 3078F DIAST BP <80 MM HG: CPT | Performed by: FAMILY MEDICINE

## 2025-06-24 RX ORDER — LOSARTAN POTASSIUM 100 MG/1
100 TABLET ORAL DAILY
Qty: 90 TABLET | Refills: 0 | Status: SHIPPED | OUTPATIENT
Start: 2025-06-24

## 2025-06-24 RX ORDER — ATORVASTATIN CALCIUM 20 MG/1
20 TABLET, FILM COATED ORAL NIGHTLY
Qty: 90 TABLET | Refills: 0 | Status: SHIPPED | OUTPATIENT
Start: 2025-06-24

## 2025-06-24 NOTE — PROGRESS NOTES
[facility] Family Medicine Office Note  Chief Complaint:   Chief Complaint   Patient presents with    Medication Follow-Up     Med check  No questions/concerns       HPI:   This is a 80 year old male coming in for medication follow-up.  The patient states that he has been doing well denies any acute concerns today.  He does have a history of hypertension hyperlipidemia he denies any chest pain nausea vomiting diarrhea constipation.      Past Medical History[1]  Past Surgical History[2]  Social History:  Short Social Hx on File[3]  Family History:  Family History[4]  Allergies:  Allergies[5]  Current Meds:  Current Medications[6]   Counseling given: Not Answered  Tobacco comments: 53 years ago       REVIEW OF SYSTEMS:   Consitutional: No fevers, chills, sweats  Eye: No recent visual problems  ENMT: No ear pain nasal congestion sore throat  Respiratory: No shortness of breath, cough  Cardiovascular: No chest pain palpitations syncope  Gastrointestinal: No nausea vomiting diarrhea  Genitourinary: No hematuria  Hema/Lymph no bruising tendency, swollen lymph glands      Medical, surgical, family, and social histories were reviewed      EXAM:   VITALS:   Vitals:    06/24/25 1002   BP: 130/64   Pulse: 73   Temp: 97.5 °F (36.4 °C)      GENERAL: well developed, well nourished, in no apparent distress  SKIN: no rashes, no suspicious lesions: Cool and Dry  HEENT: atraumatic, normocephalic, ears and throat are clear    Ears: TM's clear and visible bilaterally, no excess cerumen or erythema.   EYES: Pupils equal round and reactive.  Extraocular motions intact no scleral icterus no injection or drainage  THROAT without erythema tonsillar hypertrophy or exudate.  Uvula midline airway patent  NECK: Given midline.  No JVD or lymphadenopathy supple nontender no meningeal signs   LUNGS: clear to auscultation sounds equal bilaterally no wheezes rales or rhonchi  CARDIO: Regular rate and rhythm without murmurs gallops or  faiza      ASSESSMENT AND PLAN:   1. Essential hypertension  - losartan 100 MG Oral Tab; Take 1 tablet (100 mg total) by mouth daily.  Dispense: 90 tablet; Refill: 0  Patient blood pressure has been well-controlled he is currently on losartan he was given refills of this today.  Denies any headache or chest pain.  2. Mixed hyperlipidemia  - atorvastatin 20 MG Oral Tab; Take 1 tablet (20 mg total) by mouth nightly.  Dispense: 90 tablet; Refill: 0  - Comp Metabolic Panel (14); Future  - Lipid Panel; Future  LDL has been well-controlled he is currently on atorvastatin he was asked to continue with the medication as prescribed he has asked to follow-up to repeat blood work in 6 months.  3. Abnormal screening cardiac CT  - atorvastatin 20 MG Oral Tab; Take 1 tablet (20 mg total) by mouth nightly.  Dispense: 90 tablet; Refill: 0       Meds & Refills for this Visit:  Requested Prescriptions     Signed Prescriptions Disp Refills    losartan 100 MG Oral Tab 90 tablet 0     Sig: Take 1 tablet (100 mg total) by mouth daily.    atorvastatin 20 MG Oral Tab 90 tablet 0     Sig: Take 1 tablet (20 mg total) by mouth nightly.       Health Maintenance:  Health Maintenance Due   Topic Date Due    COVID-19 Vaccine (10 - 2024-25 season) 04/08/2025       Patient/Caregiver Education: Patient/Caregiver Education: There are no barriers to learning. Medical education done.   Outcome: Patient verbalizes understanding. Patient is notified to call with any questions, complications, allergies, or worsening or changing symptoms.  Patient is to call with any side effects or complications from the treatments as a result of today.     Problem List:  Problem List[7]      No follow-ups on file.     Tucker Rand MD    Please note that portions of this note may have been completed with a voice recognition program. Efforts were made to edit the dictations but occasionally words are mis-transcribed.       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    High blood pressure    High cholesterol    Hx of oral surgery    Visual impairment    contacts   [2]   Past Surgical History:  Procedure Laterality Date    Cholecystectomy  03/2021    Colonoscopy  09/26/2018    [place] teeth removed      asa a teen ager   [3]   Social History  Socioeconomic History    Marital status:    Tobacco Use    Smoking status: Never    Smokeless tobacco: Never    Tobacco comments:     53 years ago   Vaping Use    Vaping status: Never Used   Substance and Sexual Activity    Alcohol use: Yes     Comment: WINE 1 GLASS AT NIGHT/socially     Drug use: No   Other Topics Concern    Caffeine Concern Yes     Comment: COFFEE 3-4 DAILY    Exercise Yes     Comment: 1-2 x a week     Seat Belt Yes   [4]   Family History  Problem Relation Age of Onset    Hypertension Mother     Cancer Mother         Ovarian    Other (Parkinson's Disease) Father    [5] No Known Allergies  [6]   Current Outpatient Medications   Medication Sig Dispense Refill    losartan 100 MG Oral Tab Take 1 tablet (100 mg total) by mouth daily. 90 tablet 0    atorvastatin 20 MG Oral Tab Take 1 tablet (20 mg total) by mouth nightly. 90 tablet 0    Omega-3 Fatty Acids (FISH OIL OR) Take 1,200 mg by mouth once daily. 6 daily      Aspirin 81 MG Oral Tab Take 1 tablet (81 mg total) by mouth daily.      Multiple Vitamin (MULTIVITAMINS OR) Take  by mouth.     [7]   Patient Active Problem List  Diagnosis    Other abnormal blood chemistry    Other symptoms involving cardiovascular system    Microscopic hematuria    Essential hypertension    Right sided sciatica    BPH (benign prostatic hyperplasia)    Screening, anemia, deficiency, iron    Elevated PSA, between 10 and less than 20 ng/ml    Calculus of gallbladder with chronic cholecystitis without obstruction    Preprocedural examination

## (undated) DIAGNOSIS — I10 ESSENTIAL HYPERTENSION: ICD-10-CM

## (undated) DIAGNOSIS — R73.9 HYPERGLYCEMIA: Primary | ICD-10-CM

## (undated) DIAGNOSIS — E78.2 MIXED HYPERLIPIDEMIA: ICD-10-CM

## (undated) DIAGNOSIS — R93.1 ABNORMAL SCREENING CARDIAC CT: ICD-10-CM

## (undated) DEVICE — DISPOSABLE LAPAROSCOPIC CLIP APPLIER WITH 20 CLIPS.: Brand: EPIX® UNIVERSAL CLIP APPLIER

## (undated) DEVICE — UNDYED BRAIDED (POLYGLACTIN 910), SYNTHETIC ABSORBABLE SUTURE: Brand: COATED VICRYL

## (undated) DEVICE — 3M(TM) TEGADERM(TM) TRANSPARENT FILM DRESSING FRAME STYLE 9505W: Brand: 3M™ TEGADERM™

## (undated) DEVICE — CHLORAPREP 26ML APPLICATOR

## (undated) DEVICE — TROCAR: Brand: KII FIOS FIRST ENTRY

## (undated) DEVICE — TROCARS: Brand: KII® BALLOON BLUNT TIP SYSTEM

## (undated) DEVICE — 3M™ TEGADERM™ TRANSPARENT FILM DRESSING, 1626W, 4 IN X 4-3/4 IN (10 CM X 12 CM), 50 EACH/CARTON, 4 CARTON/CASE: Brand: 3M™ TEGADERM™

## (undated) DEVICE — GAUZE SPONGES,USP TYPE VII GAUZE, 12 PLY: Brand: CURITY

## (undated) DEVICE — KENDALL SCD EXPRESS SLEEVES, KNEE LENGTH, MEDIUM: Brand: KENDALL SCD

## (undated) DEVICE — STERILE SYNTHETIC POLYISOPRENE POWDER-FREE SURGICAL GLOVES WITH HYDROGEL COATING, SMOOTH FINISH, STRAIGHT FINGER: Brand: PROTEXIS

## (undated) DEVICE — VIOLET BRAIDED (POLYGLACTIN 910), SYNTHETIC ABSORBABLE SUTURE: Brand: COATED VICRYL

## (undated) DEVICE — 40580 - THE PINK PAD - ADVANCED TRENDELENBURG POSITIONING KIT: Brand: 40580 - THE PINK PAD - ADVANCED TRENDELENBURG POSITIONING KIT

## (undated) DEVICE — TISSUE RETRIEVAL SYSTEM: Brand: INZII RETRIEVAL SYSTEM

## (undated) DEVICE — LAP CHOLE/APPY CDS-LF: Brand: MEDLINE INDUSTRIES, INC.

## (undated) DEVICE — LIGHT HANDLE

## (undated) NOTE — MR AVS SNAPSHOT
[facility], [address] [id number]               Thank you for choosing us for your health care visit with Josh Siddiqi MD.  We are glad to serve you and happy to provide you with this bergeron Phone:  [phone]    - valsartan 160 MG Tabs            MyChart     Visit [employer]  You can access your MyChart to more actively manage your health care and view more details from this visit by going to [email or web address].   If you've recently Don’t forget strength training with weights and resistance Set goals and track your progress   You don’t need to join a gym. Home exercises work great.  Put more priority on exercise in your life                    Visit [facility] online at

## (undated) NOTE — LETTER
17    Patient: Court Montero  : 1945 Visit date: 2017    Dear  MARK Sherwood,    Thank you for referring Court Baileydorothycassandra to my practice. Please find my assessment and plan below.                 Assessment   Calculus of meenakshiadd